# Patient Record
Sex: MALE | Race: BLACK OR AFRICAN AMERICAN | Employment: FULL TIME | URBAN - METROPOLITAN AREA
[De-identification: names, ages, dates, MRNs, and addresses within clinical notes are randomized per-mention and may not be internally consistent; named-entity substitution may affect disease eponyms.]

---

## 2019-03-07 ENCOUNTER — OFFICE VISIT (OUTPATIENT)
Dept: FAMILY MEDICINE CLINIC | Age: 54
End: 2019-03-07

## 2019-03-07 VITALS
TEMPERATURE: 98.6 F | HEIGHT: 73 IN | BODY MASS INDEX: 37.37 KG/M2 | SYSTOLIC BLOOD PRESSURE: 134 MMHG | OXYGEN SATURATION: 96 % | HEART RATE: 82 BPM | RESPIRATION RATE: 16 BRPM | DIASTOLIC BLOOD PRESSURE: 97 MMHG | WEIGHT: 282 LBS

## 2019-03-07 DIAGNOSIS — I10 ESSENTIAL HYPERTENSION: ICD-10-CM

## 2019-03-07 DIAGNOSIS — N52.9 ERECTILE DYSFUNCTION, UNSPECIFIED ERECTILE DYSFUNCTION TYPE: Primary | ICD-10-CM

## 2019-03-07 PROBLEM — E66.01 SEVERE OBESITY (HCC): Status: ACTIVE | Noted: 2019-03-07

## 2019-03-07 RX ORDER — TADALAFIL 20 MG/1
20 TABLET ORAL AS NEEDED
Qty: 6 TAB | Refills: 5 | Status: SHIPPED | OUTPATIENT
Start: 2019-03-07 | End: 2019-06-05

## 2019-03-07 RX ORDER — AMLODIPINE BESYLATE 5 MG/1
5 TABLET ORAL DAILY
Qty: 30 TAB | Refills: 1 | Status: SHIPPED | OUTPATIENT
Start: 2019-03-07 | End: 2019-03-29

## 2019-03-07 NOTE — PROGRESS NOTES
Calin Matthews is a 47 y.o. male   Chief Complaint   Patient presents with   [address] [place] [address]    pt here to establish care and was [facility] and was given a BP med to take once daily and he does not know what it is and he is not currently taking it. States he took one bottle and did not refill. Pt is also requesting an Rx for cialis for ED. States he has never taken this before. he is a 47y.o. year old male who presents for evalution. Reviewed PmHx, RxHx, FmHx, SocHx, AllgHx and updated and dated in the chart. Review of Systems - negative except as listed above in the HPI    Objective:     Vitals:    03/07/19 0853   BP: (!) 134/97   Pulse: 82   Resp: 16   Temp: 98.6 °F (37 °C)   TempSrc: Oral   SpO2: 96%   Weight: 282 lb (127.9 kg)   Height: 6' 1\" (1.854 m)       Current Outpatient Medications   Medication Sig    tadalafil (CIALIS) 20 mg tablet Take 1 Tab by mouth as needed (ED).  amLODIPine (NORVASC) 5 mg tablet Take 1 Tab by mouth daily. No current facility-administered medications for this visit. Physical Examination: General appearance - alert, well appearing, and in no distress  Mental status - alert, oriented to person, place, and time  Chest - clear to auscultation, no wheezes, rales or rhonchi, symmetric air entry  Heart - normal rate, regular rhythm, normal S1, S2, no murmurs, rubs, clicks or gallops      Assessment/ Plan:   Diagnoses and all orders for this visit:    1. Erectile dysfunction, unspecified erectile dysfunction type  -     tadalafil (CIALIS) 20 mg tablet; Take 1 Tab by mouth as needed (ED). 2. Essential hypertension  -     amLODIPine (NORVASC) 5 mg tablet; Take 1 Tab by mouth daily. RTC 2-4 weeks for fasting labs  Follow-up Disposition:  Return in about 4 weeks (around 4/4/2019), or if symptoms worsen or fail to improve. I have discussed the diagnosis with the patient and the intended plan as seen in the above orders.   The patient has received an after-visit summary and questions were answered concerning future plans. Pt conveyed understanding of plan.     Medication Side Effects and Warnings were discussed with patient      Livier Mcintosh,

## 2019-03-07 NOTE — PATIENT INSTRUCTIONS
High Blood Pressure: Care Instructions  Overview    It's normal for blood pressure to go up and down throughout the day. But if it stays up, you have high blood pressure. Another name for high blood pressure is hypertension. Despite what a lot of people think, high blood pressure usually doesn't cause headaches or make you feel dizzy or lightheaded. It usually has no symptoms. But it does increase your risk of stroke, heart attack, and other problems. You and your doctor will talk about your risks of these problems based on your blood pressure. Your doctor will give you a goal for your blood pressure. Your goal will be based on your health and your age. Lifestyle changes, such as eating healthy and being active, are always important to help lower blood pressure. You might also take medicine to reach your blood pressure goal.  Follow-up care is a key part of your treatment and safety. Be sure to make and go to all appointments, and call your doctor if you are having problems. It's also a good idea to know your test results and keep a list of the medicines you take. How can you care for yourself at home? Medical treatment  · If you stop taking your medicine, your blood pressure will go back up. You may take one or more types of medicine to lower your blood pressure. Be safe with medicines. Take your medicine exactly as prescribed. Call your doctor if you think you are having a problem with your medicine. · Talk to your doctor before you start taking aspirin every day. Aspirin can help certain people lower their risk of a heart attack or stroke. But taking aspirin isn't right for everyone, because it can cause serious bleeding. · See your doctor regularly. You may need to see the doctor more often at first or until your blood pressure comes down. · If you are taking blood pressure medicine, talk to your doctor before you take decongestants or anti-inflammatory medicine, such as ibuprofen.  Some of these medicines can raise blood pressure. · Learn how to check your blood pressure at home. Lifestyle changes  · Stay at a healthy weight. This is especially important if you put on weight around the waist. Losing even 10 pounds can help you lower your blood pressure. · If your doctor recommends it, get more exercise. Walking is a good choice. Bit by bit, increase the amount you walk every day. Try for at least 30 minutes on most days of the week. You also may want to swim, bike, or do other activities. · Avoid or limit alcohol. Talk to your doctor about whether you can drink any alcohol. · Try to limit how much sodium you eat to less than 2,300 milligrams (mg) a day. Your doctor may ask you to try to eat less than 1,500 mg a day. · Eat plenty of fruits (such as bananas and oranges), vegetables, legumes, whole grains, and low-fat dairy products. · Lower the amount of saturated fat in your diet. Saturated fat is found in animal products such as milk, cheese, and meat. Limiting these foods may help you lose weight and also lower your risk for heart disease. · Do not smoke. Smoking increases your risk for heart attack and stroke. If you need help quitting, talk to your doctor about stop-smoking programs and medicines. These can increase your chances of quitting for good. When should you call for help? Call 911 anytime you think you may need emergency care. This may mean having symptoms that suggest that your blood pressure is causing a serious heart or blood vessel problem. Your blood pressure may be over 180/120.   For example, call 911 if:    · You have symptoms of a heart attack. These may include:  ? Chest pain or pressure, or a strange feeling in the chest.  ? Sweating. ? Shortness of breath. ? Nausea or vomiting. ? Pain, pressure, or a strange feeling in the back, neck, jaw, or upper belly or in one or both shoulders or arms. ? Lightheadedness or sudden weakness.   ? A fast or irregular heartbeat.     · You have symptoms of a stroke. These may include:  ? Sudden numbness, tingling, weakness, or loss of movement in your face, arm, or leg, especially on only one side of your body. ? Sudden vision changes. ? Sudden trouble speaking. ? Sudden confusion or trouble understanding simple statements. ? Sudden problems with walking or balance. ? A sudden, severe headache that is different from past headaches.     · You have severe back or belly pain.    Do not wait until your blood pressure comes down on its own. Get help right away.   Call your doctor now or seek immediate care if:    · Your blood pressure is much higher than normal (such as 180/120 or higher), but you don't have symptoms.     · You think high blood pressure is causing symptoms, such as:  ? Severe headache.  ? Blurry vision.    Watch closely for changes in your health, and be sure to contact your doctor if:    · Your blood pressure measures higher than your doctor recommends at least 2 times. That means the top number is higher or the bottom number is higher, or both.     · You think you may be having side effects from your blood pressure medicine. Where can you learn more? Go to http://carly-jud.info/. Enter E829 in the search box to learn more about \"High Blood Pressure: Care Instructions. \"  Current as of: July 22, 2018  Content Version: 11.9  © 1284-7383 [employer]. Care instructions adapted under license by [employer] (which disclaims liability or warranty for this information). If you have questions about a medical condition or this instruction, always ask your healthcare professional. [place] [address] any warranty or liability for your use of this information.

## 2019-03-07 NOTE — PROGRESS NOTES
Chief Complaint   Patient presents with   [facility].     Patient presents in office today as a NP to establish care. States that a month ago he was seen as [facility] and had elevated BP. He was sent home on BP medication but is unsure of the name of it. Not on any other medications. He was instructed to seek primary care to have this managed. Would like to see about getting an Rx for Cialis. No other concerns.     Learning Assessment 3/7/2019   PRIMARY LEARNER Patient   HIGHEST LEVEL OF EDUCATION - PRIMARY LEARNER  GRADUATED HIGH SCHOOL OR GED   BARRIERS PRIMARY LEARNER NONE   CO-LEARNER CAREGIVER No   PRIMARY LANGUAGE ENGLISH   LEARNER PREFERENCE PRIMARY LISTENING   ANSWERED BY self   RELATIONSHIP SELF

## 2019-03-29 ENCOUNTER — OFFICE VISIT (OUTPATIENT)
Dept: FAMILY MEDICINE CLINIC | Age: 54
End: 2019-03-29

## 2019-03-29 VITALS
SYSTOLIC BLOOD PRESSURE: 134 MMHG | HEIGHT: 73 IN | TEMPERATURE: 97.8 F | OXYGEN SATURATION: 95 % | HEART RATE: 77 BPM | BODY MASS INDEX: 38.57 KG/M2 | DIASTOLIC BLOOD PRESSURE: 85 MMHG | RESPIRATION RATE: 18 BRPM | WEIGHT: 291 LBS

## 2019-03-29 DIAGNOSIS — R53.83 FATIGUE, UNSPECIFIED TYPE: ICD-10-CM

## 2019-03-29 DIAGNOSIS — I10 ESSENTIAL HYPERTENSION: ICD-10-CM

## 2019-03-29 DIAGNOSIS — Z12.11 COLON CANCER SCREENING: ICD-10-CM

## 2019-03-29 DIAGNOSIS — Z00.00 PHYSICAL EXAM: Primary | ICD-10-CM

## 2019-03-29 RX ORDER — AMLODIPINE BESYLATE 10 MG/1
10 TABLET ORAL DAILY
Qty: 30 TAB | Refills: 5 | Status: SHIPPED | OUTPATIENT
Start: 2019-03-29 | End: 2020-08-04

## 2019-03-29 NOTE — PROGRESS NOTES
Chief Complaint   Patient presents with   [address]     Patient presents in office today for f/u and labs. No concerns. 1. Have you been to the ER, urgent care clinic since your last visit? Hospitalized since your last visit? No    2. Have you seen or consulted any other health care providers outside of the [address] [place] [address] since your last visit? Include any pap smears or colon screening.  No    Learning Assessment 3/7/2019   PRIMARY LEARNER Patient   HIGHEST LEVEL OF EDUCATION - PRIMARY LEARNER  GRADUATED HIGH SCHOOL OR GED   BARRIERS PRIMARY LEARNER NONE   CO-LEARNER CAREGIVER No   PRIMARY LANGUAGE ENGLISH   LEARNER PREFERENCE PRIMARY LISTENING   ANSWERED BY self   RELATIONSHIP SELF

## 2019-03-29 NOTE — PATIENT INSTRUCTIONS
A Healthy Lifestyle: Care Instructions  Your Care Instructions    A healthy lifestyle can help you feel good, stay at a healthy weight, and have plenty of energy for both work and play. A healthy lifestyle is something you can share with your whole family. A healthy lifestyle also can lower your risk for serious health problems, such as high blood pressure, heart disease, and diabetes. You can follow a few steps listed below to improve your health and the health of your family. Follow-up care is a key part of your treatment and safety. Be sure to make and go to all appointments, and call your doctor if you are having problems. It's also a good idea to know your test results and keep a list of the medicines you take. How can you care for yourself at home? · Do not eat too much sugar, fat, or fast foods. You can still have dessert and treats now and then. The goal is moderation. · Start small to improve your eating habits. Pay attention to portion sizes, drink less juice and soda pop, and eat more fruits and vegetables. ? Eat a healthy amount of food. A 3-ounce serving of meat, for example, is about the size of a deck of cards. Fill the rest of your plate with vegetables and whole grains. ? Limit the amount of soda and sports drinks you have every day. Drink more water when you are thirsty. ? Eat at least 5 servings of fruits and vegetables every day. It may seem like a lot, but it is not hard to reach this goal. A serving or helping is 1 piece of fruit, 1 cup of vegetables, or 2 cups of leafy, raw vegetables. Have an apple or some carrot sticks as an afternoon snack instead of a candy bar. Try to have fruits and/or vegetables at every meal.  · Make exercise part of your daily routine. You may want to start with simple activities, such as walking, bicycling, or slow swimming. Try to be active 30 to 60 minutes every day. You do not need to do all 30 to 60 minutes all at once.  For example, you can exercise 3 times a day for 10 or 20 minutes. Moderate exercise is safe for most people, but it is always a good idea to talk to your doctor before starting an exercise program.  · Keep moving. Evan Geronimos the lawn, work in the garden, or [employer]. Take the stairs instead of the elevator at work. · If you smoke, quit. People who smoke have an increased risk for heart attack, stroke, cancer, and other lung illnesses. Quitting is hard, but there are ways to boost your chance of quitting tobacco for good. ? Use nicotine gum, patches, or lozenges. ? Ask your doctor about stop-smoking programs and medicines. ? Keep trying. In addition to reducing your risk of diseases in the future, you will notice some benefits soon after you stop using tobacco. If you have shortness of breath or asthma symptoms, they will likely get better within a few weeks after you quit. · Limit how much alcohol you drink. Moderate amounts of alcohol (up to 2 drinks a day for men, 1 drink a day for women) are okay. But drinking too much can lead to liver problems, high blood pressure, and other health problems. Family health  If you have a family, there are many things you can do together to improve your health. · Eat meals together as a family as often as possible. · Eat healthy foods. This includes fruits, vegetables, lean meats and dairy, and whole grains. · Include your family in your fitness plan. Most people think of activities such as jogging or tennis as the way to fitness, but there are many ways you and your family can be more active. Anything that makes you breathe hard and gets your heart pumping is exercise. Here are some tips:  ? Walk to do errands or to take your child to school or the bus.  ? Go for a family bike ride after dinner instead of watching TV. Where can you learn more? Go to http://carly-jud.info/. Enter B468 in the search box to learn more about \"A Healthy Lifestyle: Care Instructions. \"  Current as of: September 11, 2018  Content Version: 11.9  © 9711-3957 [employer]. Care instructions adapted under license by [employer] (which disclaims liability or warranty for this information). If you have questions about a medical condition or this instruction, always ask your healthcare professional. [address] any warranty or liability for your use of this information.

## 2019-03-29 NOTE — PROGRESS NOTES
Dennis Kaiser is a 47 y.o. male   Chief Complaint   Patient presents with    Follow-up    Labs    pt here for CPE and his BP recheck. Pt is otherwise doing well and is taking the norvasc 5mg everyday. No dizzy spells no near syncopal episodes. Pt would also like his T level checked and states he is tired all of the time. Chief Complaint   Patient presents with   [address]     he is a 47y.o. year old male who presents for evalution. Reviewed PmHx, RxHx, FmHx, SocHx, AllgHx and updated and dated in the chart. Review of Systems - negative except as listed above in the HPI    Objective:     Vitals:    03/29/19 0857   BP: 134/85   Pulse: 77   Resp: 18   Temp: 97.8 °F (36.6 °C)   TempSrc: Oral   SpO2: 95%   Weight: 291 lb (132 kg)   Height: 6' 1\" (1.854 m)     Physical Examination: General appearance - alert, well appearing, and in no distress  Eyes - pupils equal and reactive, extraocular eye movements intact  Ears - bilateral TM's and external ear canals normal  Mouth - mucous membranes moist, pharynx normal without lesions  Neck - supple, no significant adenopathy  Lymphatics - no palpable lymphadenopathy, no hepatosplenomegaly  Chest - clear to auscultation, no wheezes, rales or rhonchi, symmetric air entry  Heart - normal rate, regular rhythm, normal S1, S2, no murmurs, rubs, clicks or gallops  Abdomen - soft, nontender, nondistended, no masses or organomegaly  Back exam - full range of motion, no tenderness, palpable spasm or pain on motion  Neurological - alert, oriented, normal speech, no focal findings or movement disorder noted  Musculoskeletal - no joint tenderness, deformity or swelling  Extremities - peripheral pulses normal, no pedal edema, no clubbing or cyanosis    Assessment/ Plan:   Diagnoses and all orders for this visit:    1.  Physical exam  -     LIPID PANEL  -     METABOLIC PANEL, COMPREHENSIVE  -     CBC WITH AUTOMATED DIFF  -     TSH 3RD GENERATION  -     PSA, DIAGNOSTIC (PROSTATE SPECIFIC AG)  -     TESTOSTERONE, FREE & TOTAL    2. Essential hypertension  -     amLODIPine (NORVASC) 10 mg tablet; Take 1 Tab by mouth daily. 3. Colon cancer screening  -     REFERRAL TO GASTROENTEROLOGY    4. Fatigue, unspecified type  -     TESTOSTERONE, FREE & TOTAL       -Patient is in good health  -Discussed with patient cancer risk factors and screens needed  -Patient needs a colonoscopy yes  -Labs from previous visits were discussed with patient yes  -Discussed with patient diet and exercise=yes  -Discussed with patient testicular (male)/breast self exam (female)= yes  Follow-up and Dispositions    · Return if symptoms worsen or fail to improve. I have discussed the diagnosis with the patient and the intended plan as seen in the above orders. The patient has received an after-visit summary and questions were answered concerning future plans. Pt conveyed understanding. Medication Side Effects and Warnings were discussed with patient: yes  Patient Labs were reviewed and or requested: yes  Patient Past Records were reviewed and or requested  yes    Patient Instructions   A Healthy Lifestyle: Care Instructions  Your Care Instructions    A healthy lifestyle can help you feel good, stay at a healthy weight, and have plenty of energy for both work and play. A healthy lifestyle is something you can share with your whole family. A healthy lifestyle also can lower your risk for serious health problems, such as high blood pressure, heart disease, and diabetes. You can follow a few steps listed below to improve your health and the health of your family. Follow-up care is a key part of your treatment and safety. Be sure to make and go to all appointments, and call your doctor if you are having problems. It's also a good idea to know your test results and keep a list of the medicines you take. How can you care for yourself at home?   · Do not eat too much sugar, fat, or fast foods. You can still have dessert and treats now and then. The goal is moderation. · Start small to improve your eating habits. Pay attention to portion sizes, drink less juice and soda pop, and eat more fruits and vegetables. ? Eat a healthy amount of food. A 3-ounce serving of meat, for example, is about the size of a deck of cards. Fill the rest of your plate with vegetables and whole grains. ? Limit the amount of soda and sports drinks you have every day. Drink more water when you are thirsty. ? Eat at least 5 servings of fruits and vegetables every day. It may seem like a lot, but it is not hard to reach this goal. A serving or helping is 1 piece of fruit, 1 cup of vegetables, or 2 cups of leafy, raw vegetables. Have an apple or some carrot sticks as an afternoon snack instead of a candy bar. Try to have fruits and/or vegetables at every meal.  · Make exercise part of your daily routine. You may want to start with simple activities, such as walking, bicycling, or slow swimming. Try to be active 30 to 60 minutes every day. You do not need to do all 30 to 60 minutes all at once. For example, you can exercise 3 times a day for 10 or 20 minutes. Moderate exercise is safe for most people, but it is always a good idea to talk to your doctor before starting an exercise program.  · Keep moving. Jessesonido Erazomarixa the lawn, work in the garden, or [employer]. Take the stairs instead of the elevator at work. · If you smoke, quit. People who smoke have an increased risk for heart attack, stroke, cancer, and other lung illnesses. Quitting is hard, but there are ways to boost your chance of quitting tobacco for good. ? Use nicotine gum, patches, or lozenges. ? Ask your doctor about stop-smoking programs and medicines. ? Keep trying.   In addition to reducing your risk of diseases in the future, you will notice some benefits soon after you stop using tobacco. If you have shortness of breath or asthma symptoms, they will likely get better within a few weeks after you quit. · Limit how much alcohol you drink. Moderate amounts of alcohol (up to 2 drinks a day for men, 1 drink a day for women) are okay. But drinking too much can lead to liver problems, high blood pressure, and other health problems. Family health  If you have a family, there are many things you can do together to improve your health. · Eat meals together as a family as often as possible. · Eat healthy foods. This includes fruits, vegetables, lean meats and dairy, and whole grains. · Include your family in your fitness plan. Most people think of activities such as jogging or tennis as the way to fitness, but there are many ways you and your family can be more active. Anything that makes you breathe hard and gets your heart pumping is exercise. Here are some tips:  ? Walk to do errands or to take your child to school or the bus.  ? Go for a family bike ride after dinner instead of watching TV. Where can you learn more? Go to http://carly-jud.info/. Enter P843 in the search box to learn more about \"A Healthy Lifestyle: Care Instructions. \"  Current as of: September 11, 2018  Content Version: 11.9  © 6301-5568 [employer]. Care instructions adapted under license by [employer] (which disclaims liability or warranty for this information). If you have questions about a medical condition or this instruction, always ask your healthcare professional. [place] [address] any warranty or liability for your use of this information.               Dr. Kemi Payne

## 2019-03-31 LAB
ALBUMIN SERPL-MCNC: 4.5 G/DL (ref 3.5–5.5)
ALBUMIN/GLOB SERPL: 1.5 {RATIO} (ref 1.2–2.2)
ALP SERPL-CCNC: 50 IU/L (ref 39–117)
ALT SERPL-CCNC: 39 IU/L (ref 0–44)
AST SERPL-CCNC: 25 IU/L (ref 0–40)
BASOPHILS # BLD AUTO: 0.1 X10E3/UL (ref 0–0.2)
BASOPHILS NFR BLD AUTO: 2 %
BILIRUB SERPL-MCNC: 0.9 MG/DL (ref 0–1.2)
BUN SERPL-MCNC: 12 MG/DL (ref 6–24)
BUN/CREAT SERPL: 10 (ref 9–20)
CALCIUM SERPL-MCNC: 9.2 MG/DL (ref 8.7–10.2)
CHLORIDE SERPL-SCNC: 100 MMOL/L (ref 96–106)
CHOLEST SERPL-MCNC: 171 MG/DL (ref 100–199)
CO2 SERPL-SCNC: 25 MMOL/L (ref 20–29)
CREAT SERPL-MCNC: 1.17 MG/DL (ref 0.76–1.27)
EOSINOPHIL # BLD AUTO: 0.3 X10E3/UL (ref 0–0.4)
EOSINOPHIL NFR BLD AUTO: 5 %
ERYTHROCYTE [DISTWIDTH] IN BLOOD BY AUTOMATED COUNT: 13.4 % (ref 12.3–15.4)
GLOBULIN SER CALC-MCNC: 3.1 G/DL (ref 1.5–4.5)
GLUCOSE SERPL-MCNC: 88 MG/DL (ref 65–99)
HCT VFR BLD AUTO: 43.2 % (ref 37.5–51)
HDLC SERPL-MCNC: 38 MG/DL
HGB BLD-MCNC: 14.2 G/DL (ref 13–17.7)
IMM GRANULOCYTES # BLD AUTO: 0 X10E3/UL (ref 0–0.1)
IMM GRANULOCYTES NFR BLD AUTO: 0 %
INTERPRETATION, 910389: NORMAL
LDLC SERPL CALC-MCNC: 109 MG/DL (ref 0–99)
LYMPHOCYTES # BLD AUTO: 2.6 X10E3/UL (ref 0.7–3.1)
LYMPHOCYTES NFR BLD AUTO: 43 %
MCH RBC QN AUTO: 29.4 PG (ref 26.6–33)
MCHC RBC AUTO-ENTMCNC: 32.9 G/DL (ref 31.5–35.7)
MCV RBC AUTO: 89 FL (ref 79–97)
MONOCYTES # BLD AUTO: 0.5 X10E3/UL (ref 0.1–0.9)
MONOCYTES NFR BLD AUTO: 8 %
NEUTROPHILS # BLD AUTO: 2.6 X10E3/UL (ref 1.4–7)
NEUTROPHILS NFR BLD AUTO: 42 %
PLATELET # BLD AUTO: 250 X10E3/UL (ref 150–379)
POTASSIUM SERPL-SCNC: 4.2 MMOL/L (ref 3.5–5.2)
PROT SERPL-MCNC: 7.6 G/DL (ref 6–8.5)
PSA SERPL-MCNC: 1 NG/ML (ref 0–4)
RBC # BLD AUTO: 4.83 X10E6/UL (ref 4.14–5.8)
SODIUM SERPL-SCNC: 138 MMOL/L (ref 134–144)
TESTOST FREE SERPL-MCNC: 6.2 PG/ML (ref 7.2–24)
TESTOST SERPL-MCNC: 274 NG/DL (ref 264–916)
TRIGL SERPL-MCNC: 118 MG/DL (ref 0–149)
TSH SERPL DL<=0.005 MIU/L-ACNC: 0.53 UIU/ML (ref 0.45–4.5)
VLDLC SERPL CALC-MCNC: 24 MG/DL (ref 5–40)
WBC # BLD AUTO: 6 X10E3/UL (ref 3.4–10.8)

## 2019-04-01 NOTE — PROGRESS NOTES
T level normal, LDL is a little above goal.  Continue to follow a healthy diet and regular exercise.   F/u for BP recheck as discussed, 1 month

## 2019-04-02 ENCOUNTER — TELEPHONE (OUTPATIENT)
Dept: FAMILY MEDICINE CLINIC | Age: 54
End: 2019-04-02

## 2019-04-02 NOTE — TELEPHONE ENCOUNTER
Patient returning call for lab results. Results/ rec reviewed as per Dr. Kendal Hawkins. Appt x 1 month made 5/1/19 for bp check.

## 2019-05-01 ENCOUNTER — OFFICE VISIT (OUTPATIENT)
Dept: FAMILY MEDICINE CLINIC | Age: 54
End: 2019-05-01

## 2019-05-01 VITALS
RESPIRATION RATE: 18 BRPM | HEIGHT: 73 IN | WEIGHT: 292 LBS | DIASTOLIC BLOOD PRESSURE: 87 MMHG | BODY MASS INDEX: 38.7 KG/M2 | OXYGEN SATURATION: 93 % | HEART RATE: 77 BPM | TEMPERATURE: 98.4 F | SYSTOLIC BLOOD PRESSURE: 134 MMHG

## 2019-05-01 DIAGNOSIS — I10 ESSENTIAL HYPERTENSION: Primary | ICD-10-CM

## 2019-05-01 NOTE — PROGRESS NOTES
Chief Complaint Patient presents with  Follow-up Patient presents in office today for 1 month f/u to BP. No concerns. 1. Have you been to the ER, urgent care clinic since your last visit? Hospitalized since your last visit? No 
 
2. Have you seen or consulted any other health care providers outside of the [address] [place] [address] since your last visit? Include any pap smears or colon screening. No 
 
Learning Assessment 3/7/2019 PRIMARY LEARNER Patient HIGHEST LEVEL OF EDUCATION - PRIMARY LEARNER  GRADUATED HIGH SCHOOL OR GED  
BARRIERS PRIMARY LEARNER NONE  
CO-LEARNER CAREGIVER No  
PRIMARY LANGUAGE ENGLISH  
LEARNER PREFERENCE PRIMARY LISTENING  
ANSWERED BY self RELATIONSHIP SELF

## 2019-05-01 NOTE — PATIENT INSTRUCTIONS
High Blood Pressure: Care Instructions Overview It's normal for blood pressure to go up and down throughout the day. But if it stays up, you have high blood pressure. Another name for high blood pressure is hypertension. Despite what a lot of people think, high blood pressure usually doesn't cause headaches or make you feel dizzy or lightheaded. It usually has no symptoms. But it does increase your risk of stroke, heart attack, and other problems. You and your doctor will talk about your risks of these problems based on your blood pressure. Your doctor will give you a goal for your blood pressure. Your goal will be based on your health and your age. Lifestyle changes, such as eating healthy and being active, are always important to help lower blood pressure. You might also take medicine to reach your blood pressure goal. 
Follow-up care is a key part of your treatment and safety. Be sure to make and go to all appointments, and call your doctor if you are having problems. It's also a good idea to know your test results and keep a list of the medicines you take. How can you care for yourself at home? Medical treatment · If you stop taking your medicine, your blood pressure will go back up. You may take one or more types of medicine to lower your blood pressure. Be safe with medicines. Take your medicine exactly as prescribed. Call your doctor if you think you are having a problem with your medicine. · Talk to your doctor before you start taking aspirin every day. Aspirin can help certain people lower their risk of a heart attack or stroke. But taking aspirin isn't right for everyone, because it can cause serious bleeding. · See your doctor regularly. You may need to see the doctor more often at first or until your blood pressure comes down. · If you are taking blood pressure medicine, talk to your doctor before you take decongestants or anti-inflammatory medicine, such as ibuprofen. Some of these medicines can raise blood pressure. · Learn how to check your blood pressure at home. Lifestyle changes · Stay at a healthy weight. This is especially important if you put on weight around the waist. Losing even 10 pounds can help you lower your blood pressure. · If your doctor recommends it, get more exercise. Walking is a good choice. Bit by bit, increase the amount you walk every day. Try for at least 30 minutes on most days of the week. You also may want to swim, bike, or do other activities. · Avoid or limit alcohol. Talk to your doctor about whether you can drink any alcohol. · Try to limit how much sodium you eat to less than 2,300 milligrams (mg) a day. Your doctor may ask you to try to eat less than 1,500 mg a day. · Eat plenty of fruits (such as bananas and oranges), vegetables, legumes, whole grains, and low-fat dairy products. · Lower the amount of saturated fat in your diet. Saturated fat is found in animal products such as milk, cheese, and meat. Limiting these foods may help you lose weight and also lower your risk for heart disease. · Do not smoke. Smoking increases your risk for heart attack and stroke. If you need help quitting, talk to your doctor about stop-smoking programs and medicines. These can increase your chances of quitting for good. When should you call for help? Call 911 anytime you think you may need emergency care. This may mean having symptoms that suggest that your blood pressure is causing a serious heart or blood vessel problem. Your blood pressure may be over 180/120. 
 For example, call 911 if: 
  · You have symptoms of a heart attack. These may include: 
? Chest pain or pressure, or a strange feeling in the chest. 
? Sweating. ? Shortness of breath. ? Nausea or vomiting. ? Pain, pressure, or a strange feeling in the back, neck, jaw, or upper belly or in one or both shoulders or arms. ? Lightheadedness or sudden weakness. ? A fast or irregular heartbeat.  
  · You have symptoms of a stroke. These may include: 
? Sudden numbness, tingling, weakness, or loss of movement in your face, arm, or leg, especially on only one side of your body. ? Sudden vision changes. ? Sudden trouble speaking. ? Sudden confusion or trouble understanding simple statements. ? Sudden problems with walking or balance. ? A sudden, severe headache that is different from past headaches.  
  · You have severe back or belly pain.  
 Do not wait until your blood pressure comes down on its own. Get help right away. 
 Call your doctor now or seek immediate care if: 
  · Your blood pressure is much higher than normal (such as 180/120 or higher), but you don't have symptoms.  
  · You think high blood pressure is causing symptoms, such as: 
? Severe headache. 
? Blurry vision.  
 Watch closely for changes in your health, and be sure to contact your doctor if: 
  · Your blood pressure measures higher than your doctor recommends at least 2 times. That means the top number is higher or the bottom number is higher, or both.  
  · You think you may be having side effects from your blood pressure medicine. Where can you learn more? Go to http://carly-jud.info/. Enter S737 in the search box to learn more about \"High Blood Pressure: Care Instructions. \" Current as of: July 22, 2018 Content Version: 11.9 © 6383-6814 [employer]. Care instructions adapted under license by [employer] (which disclaims liability or warranty for this information). If you have questions about a medical condition or this instruction, always ask your healthcare professional. [place] [address] any warranty or liability for your use of this information.

## 2019-05-01 NOTE — PROGRESS NOTES
Federico May is a 47 y.o. male Chief Complaint Patient presents with  Follow-up  
 pt here for BP recheck and states had it  Checked on Sunday and was 130/80. Pt currently on norvasc 10mg daily. Pt just started to do cardiovascular exercise. Pt eats a lot of processed foods states is on the road. he is a 47y.o. year old male who presents for evalution. Reviewed PmHx, RxHx, FmHx, SocHx, AllgHx and updated and dated in the chart. Review of Systems - negative except as listed above in the HPI Objective:  
 
Vitals:  
 05/01/19 0847 BP: 134/87 Pulse: 77 Resp: 18 Temp: 98.4 °F (36.9 °C) TempSrc: Oral  
SpO2: 93% Weight: 292 lb (132.5 kg) Height: 6' 1\" (1.854 m) Current Outpatient Medications Medication Sig  
 amLODIPine (NORVASC) 10 mg tablet Take 1 Tab by mouth daily.  tadalafil (CIALIS) 20 mg tablet Take 1 Tab by mouth as needed (ED). No current facility-administered medications for this visit. Physical Examination: General appearance - alert, well appearing, and in no distress Chest - clear to auscultation, no wheezes, rales or rhonchi, symmetric air entry Heart - normal rate, regular rhythm, normal S1, S2, no murmurs, rubs, clicks or gallops Assessment/ Plan:  
Diagnoses and all orders for this visit: 1. Essential hypertension 
 
 lifestyle changes c/w norvasc 10 and recheck 3-6 months Follow-up and Dispositions · Return in about 3 months (around 8/1/2019), or if symptoms worsen or fail to improve. I have discussed the diagnosis with the patient and the intended plan as seen in the above orders. The patient has received an after-visit summary and questions were answered concerning future plans. Pt conveyed understanding of plan. Medication Side Effects and Warnings were discussed with patient [address] [facility], DO

## 2019-06-12 ENCOUNTER — ANESTHESIA EVENT (OUTPATIENT)
Dept: ENDOSCOPY | Age: 54
End: 2019-06-12
Payer: COMMERCIAL

## 2019-06-12 ENCOUNTER — ANESTHESIA (OUTPATIENT)
Dept: ENDOSCOPY | Age: 54
End: 2019-06-12
Payer: COMMERCIAL

## 2019-06-12 ENCOUNTER — HOSPITAL ENCOUNTER (OUTPATIENT)
Age: 54
Setting detail: OUTPATIENT SURGERY
Discharge: HOME | End: 2019-06-12
Attending: INTERNAL MEDICINE | Admitting: INTERNAL MEDICINE
Payer: COMMERCIAL

## 2019-06-12 VITALS
SYSTOLIC BLOOD PRESSURE: 132 MMHG | HEART RATE: 78 BPM | TEMPERATURE: 97.9 F | WEIGHT: 281.09 LBS | OXYGEN SATURATION: 96 % | HEIGHT: 73 IN | BODY MASS INDEX: 37.25 KG/M2 | DIASTOLIC BLOOD PRESSURE: 89 MMHG | RESPIRATION RATE: 16 BRPM

## 2019-06-12 LAB — COLONOSCOPY, EXTERNAL: NORMAL

## 2019-06-12 PROCEDURE — 76040000019: Performed by: INTERNAL MEDICINE

## 2019-06-12 PROCEDURE — 74011250636 HC RX REV CODE- 250/636: Performed by: INTERNAL MEDICINE

## 2019-06-12 PROCEDURE — 74011250636 HC RX REV CODE- 250/636

## 2019-06-12 PROCEDURE — 77030021593 HC FCPS BIOP ENDOSC BSC -A: Performed by: INTERNAL MEDICINE

## 2019-06-12 PROCEDURE — 88305 TISSUE EXAM BY PATHOLOGIST: CPT

## 2019-06-12 PROCEDURE — 76060000031 HC ANESTHESIA FIRST 0.5 HR: Performed by: INTERNAL MEDICINE

## 2019-06-12 RX ORDER — SODIUM CHLORIDE 9 MG/ML
INJECTION, SOLUTION INTRAVENOUS
Status: DISCONTINUED | OUTPATIENT
Start: 2019-06-12 | End: 2019-06-12 | Stop reason: HOSPADM

## 2019-06-12 RX ORDER — FLUMAZENIL 0.1 MG/ML
0.2 INJECTION INTRAVENOUS
Status: DISCONTINUED | OUTPATIENT
Start: 2019-06-12 | End: 2019-06-12 | Stop reason: HOSPADM

## 2019-06-12 RX ORDER — PROPOFOL 10 MG/ML
INJECTION, EMULSION INTRAVENOUS
Status: DISCONTINUED | OUTPATIENT
Start: 2019-06-12 | End: 2019-06-12 | Stop reason: HOSPADM

## 2019-06-12 RX ORDER — EPINEPHRINE 0.1 MG/ML
1 INJECTION INTRACARDIAC; INTRAVENOUS
Status: DISCONTINUED | OUTPATIENT
Start: 2019-06-12 | End: 2019-06-12 | Stop reason: HOSPADM

## 2019-06-12 RX ORDER — NALOXONE HYDROCHLORIDE 0.4 MG/ML
0.4 INJECTION, SOLUTION INTRAMUSCULAR; INTRAVENOUS; SUBCUTANEOUS
Status: DISCONTINUED | OUTPATIENT
Start: 2019-06-12 | End: 2019-06-12 | Stop reason: HOSPADM

## 2019-06-12 RX ORDER — SODIUM CHLORIDE 9 MG/ML
50 INJECTION, SOLUTION INTRAVENOUS CONTINUOUS
Status: DISCONTINUED | OUTPATIENT
Start: 2019-06-12 | End: 2019-06-12 | Stop reason: HOSPADM

## 2019-06-12 RX ORDER — MIDAZOLAM HYDROCHLORIDE 1 MG/ML
.25-5 INJECTION, SOLUTION INTRAMUSCULAR; INTRAVENOUS
Status: DISCONTINUED | OUTPATIENT
Start: 2019-06-12 | End: 2019-06-12 | Stop reason: HOSPADM

## 2019-06-12 RX ORDER — PROPOFOL 10 MG/ML
INJECTION, EMULSION INTRAVENOUS AS NEEDED
Status: DISCONTINUED | OUTPATIENT
Start: 2019-06-12 | End: 2019-06-12 | Stop reason: HOSPADM

## 2019-06-12 RX ORDER — LIDOCAINE HYDROCHLORIDE 20 MG/ML
INJECTION, SOLUTION EPIDURAL; INFILTRATION; INTRACAUDAL; PERINEURAL AS NEEDED
Status: DISCONTINUED | OUTPATIENT
Start: 2019-06-12 | End: 2019-06-12 | Stop reason: HOSPADM

## 2019-06-12 RX ORDER — DEXTROMETHORPHAN/PSEUDOEPHED 2.5-7.5/.8
1.2 DROPS ORAL
Status: DISCONTINUED | OUTPATIENT
Start: 2019-06-12 | End: 2019-06-12 | Stop reason: HOSPADM

## 2019-06-12 RX ORDER — ATROPINE SULFATE 0.1 MG/ML
0.4 INJECTION INTRAVENOUS
Status: DISCONTINUED | OUTPATIENT
Start: 2019-06-12 | End: 2019-06-12 | Stop reason: HOSPADM

## 2019-06-12 RX ORDER — ASPIRIN 81 MG/1
TABLET ORAL DAILY
COMMUNITY

## 2019-06-12 RX ADMIN — PROPOFOL 100 MCG/KG/MIN: 10 INJECTION, EMULSION INTRAVENOUS at 14:59

## 2019-06-12 RX ADMIN — SODIUM CHLORIDE 50 ML/HR: 900 INJECTION, SOLUTION INTRAVENOUS at 13:35

## 2019-06-12 RX ADMIN — SODIUM CHLORIDE: 9 INJECTION, SOLUTION INTRAVENOUS at 14:43

## 2019-06-12 RX ADMIN — PROPOFOL 150 MG: 10 INJECTION, EMULSION INTRAVENOUS at 14:59

## 2019-06-12 RX ADMIN — LIDOCAINE HYDROCHLORIDE 60 MG: 20 INJECTION, SOLUTION EPIDURAL; INFILTRATION; INTRACAUDAL; PERINEURAL at 14:59

## 2019-06-12 NOTE — ANESTHESIA PREPROCEDURE EVALUATION
Relevant Problems   No relevant active problems       Anesthetic History   No history of anesthetic complications            Review of Systems / Medical History  Patient summary reviewed, nursing notes reviewed and pertinent labs reviewed    Pulmonary  Within defined limits                 Neuro/Psych   Within defined limits           Cardiovascular    Hypertension: well controlled              Exercise tolerance: >4 METS     GI/Hepatic/Renal               Comments: Screening colonoscopy Endo/Other        Obesity  Pertinent negatives: No blood dyscrasia   Other Findings              Physical Exam    Airway  Mallampati: II    Neck ROM: normal range of motion   Mouth opening: Normal     Cardiovascular    Rhythm: regular  Rate: normal         Dental  No notable dental hx       Pulmonary  Breath sounds clear to auscultation               Abdominal  GI exam deferred       Other Findings            Anesthetic Plan    ASA: 2  Anesthesia type: MAC          Induction: Intravenous  Anesthetic plan and risks discussed with: Patient

## 2019-06-12 NOTE — H&P
Kalani Mojica M.D.  [phone]            History and Physical       NAME:  Rema Almanza   :   1965   MRN:   [id number]       Referring Physician:  Farhat Garibay DO      Consult Date: 2019 9:59 AM    Chief Complaint:  Colon cancer screening    History of Present Illness:  Patient is a 47 y.o. who is seen for colon cancer screening. Denies any ongoing GI complaints. PMH:  Past Medical History:   Diagnosis Date    Hypertension        PSH:  Past Surgical History:   Procedure Laterality Date    HX ORTHOPAEDIC         Allergies:  No Known Allergies    Home Medications:  Cannot display prior to admission medications because the patient has not been admitted in this contact. Hospital Medications:  No current facility-administered medications for this encounter. Current Outpatient Medications   Medication Sig    amLODIPine (NORVASC) 10 mg tablet Take 1 Tab by mouth daily. Social History:  Social History     Tobacco Use    Smoking status: Never Smoker    Smokeless tobacco: Never Used   Substance Use Topics    Alcohol use: No       Family History:  Family History   Problem Relation Age of Onset    Hypertension Mother              Review of Systems:      Constitutional: negative fever, negative chills, negative weight loss  Eyes:   negative visual changes  ENT:   negative sore throat, tongue or lip swelling  Respiratory:  negative cough, negative dyspnea  Cards:  negative for chest pain, palpitations, lower extremity edema  GI:   See HPI  :  negative for frequency, dysuria  Integument:  negative for rash and pruritus  Heme:  negative for easy bruising and gum/nose bleeding  Musculoskel: negative for myalgias,  back pain and muscle weakness  Neuro: negative for headaches, dizziness, vertigo  Psych:  negative for feelings of anxiety, depression       Objective:   No data found. No intake/output data recorded.   No intake/output data recorded. EXAM:     NEURO-a&o   HEENT-wnl   LUNGS-clear    COR-regular rate and rhythym     ABD-soft , no tenderness, no rebound, good bs     EXT-no edema     Data Review     No results for input(s): WBC, HGB, HCT, PLT, HGBEXT, HCTEXT, PLTEXT in the last 72 hours. No results for input(s): NA, K, CL, CO2, BUN, CREA, GLU, PHOS, CA in the last 72 hours. No results for input(s): SGOT, GPT, AP, TBIL, TP, ALB, GLOB, GGT, AML, LPSE in the last 72 hours. No lab exists for component: AMYP, HLPSE  No results for input(s): INR, PTP, APTT in the last 72 hours. No lab exists for component: INREXT    Patient Active Problem List   Diagnosis Code    Headache(784.0) R51    Severe obesity ([facility] [address] E66.01    Essential hypertension I10      Assessment:   · Colon cancer screening   Plan:   · Colonoscopy today.      Signed By: Olga Yanez MD     6/12/2019  9:59 AM

## 2019-06-12 NOTE — PROCEDURES
Erika Palencia M.D.  [phone]            2019          Colonoscopy Operative Report  Shaquille Zuniga  :  1965  [facility] Medical Record Number:  [id number]      Indications:    Screening colonoscopy     :  Noé Ennis MD    Assistant -- None  Implants -- None    Referring Provider: Elliot Hernandez DO    Sedation:  MAC anesthesia Propofol    Pre-Procedural Exam:      Airway: clear,  No airway problems anticipated  Heart: RRR, without gallops or rubs  Lungs: clear bilaterally without wheezes, crackles, or rhonchi  Abdomen: soft, nontender, nondistended, bowel sounds present  Mental Status: awake, alert and oriented to person, place and time     Procedure Details:  After informed consent was obtained with all risks and benefits of procedure explained and preoperative exam completed, the patient was taken to the endoscopy suite and placed in the left lateral decubitus position. Upon sequential sedation as per above, a digital rectal exam was performed. The Olympus videocolonoscope  was inserted in the rectum and carefully advanced to the cecum, which was identified by the ileocecal valve and appendiceal orifice. The quality of preparation was fair. The colonoscope was slowly withdrawn with careful inspection and evaluation between folds. Retroflexion in the rectum was performed. Findings:     Cecum: normal  Ascending Colon: normal  Transverse Colon: normal  Descending Colon: 1  Sessile polyp(s), the largest 5 mm in size;  Sigmoid: normal  Rectum: normal    Interventions:  1 complete polypectomy were performed using cold biopsy forceps and the polyps were  retrieved    Specimen Removed:  specimen #1, 5 mm in size, located in the descending colon removed by cold biopsy and sent for pathology    Complications: None. EBL:  None. Impression:  1 descending colon polyp removed, otherwise normal exam    Recommendations:  -Await pathology.   -If adenoma is present, repeat colonoscopy in 5 years.   -High fiber diet.    -Resume normal medication(s). Discharge Disposition:  Home in the company of a  when able to ambulate.     Austin Robledo MD  6/12/2019  3:23 PM

## 2019-06-12 NOTE — DISCHARGE INSTRUCTIONS
[facility]. Jennifer Taylor M.D.  [phone]          COLON DISCHARGE INSTRUCTIONS       2019    Gurpreet Kraft  :  1965  [facility] Medical Record Number:  [id number]      COLONOSCOPY FINDINGS:  Your colonoscopy showed: 1 polyp removed. DISCOMFORT:  Redness at IV site- apply warm compress to area; if redness or soreness persist- contact your physician  There may be a slight amount of blood passed from the rectum  Gaseous discomfort- walking, belching will help relieve any discomfort  You may not operate a vehicle for 12 hours  You may not engage in an occupation involving machinery or appliances for rest of today  You may not drink alcoholic beverages for at least 12 hours  Avoid making any critical decisions for at least 24 hour  DIET:   High fiber diet. - however -  remember your colon is empty and a heavy meal will produce gas. Avoid these foods:  vegetables, fried / greasy foods, carbonated drinks for today     ACTIVITY:  You may resume your normal daily activities it is recommended that you spend the remainder of the day resting -  avoid any strenuous activity. CALL M.D. ANY SIGN OF:   Increasing pain, nausea, vomiting  Abdominal distension (swelling)  New increased bleeding (oral or rectal)  Fever (chills)  Pain in chest area  Bloody discharge from nose or mouth   Shortness of breath    Follow-up Instructions:   Call Dr. Benedict Durán if any questions or problems. Telephone # [phone]  Biopsy results will be available in  5 to 7 days  Should have a repeat colonoscopy in 5-10 years. Patient Education        Colon Polyps: Care Instructions  Your Care Instructions    Colon polyps are growths in the colon or the rectum. The cause of most colon polyps is not known, and most people who get them do not have any problems. But a certain kind can turn into cancer.  For this reason, regular testing for colon polyps is important for people age 48 and older and anyone who has an increased risk for colon cancer. Polyps are usually found through routine colon cancer screening tests. Although most colon polyps are not cancerous, they are usually removed and then tested for cancer. Screening for colon cancer saves lives because the cancer can usually be cured if it is caught early. If you have a polyp that is the type that can turn into cancer, you may need more tests to examine your entire colon. The doctor will remove any other polyps that he or she finds, and you will be tested more often. Follow-up care is a key part of your treatment and safety. Be sure to make and go to all appointments, and call your doctor if you are having problems. It's also a good idea to know your test results and keep a list of the medicines you take. How can you care for yourself at home? Regular exams to look for colon polyps are the best way to prevent polyps from turning into colon cancer. These can include stool tests, sigmoidoscopy, colonoscopy, and CT colonography. Talk with your doctor about a testing schedule that is right for you. To prevent polyps  There is no home treatment that can prevent colon polyps. But these steps may help lower your risk for cancer. · Stay active. Being active can help you get to and stay at a healthy weight. Try to exercise on most days of the week. Walking is a good choice. · Eat well. Choose a variety of vegetables, fruits, legumes (such as peas and beans), fish, poultry, and whole grains. · Do not smoke. If you need help quitting, talk to your doctor about stop-smoking programs and medicines. These can increase your chances of quitting for good. · If you drink alcohol, limit how much you drink. Limit alcohol to 2 drinks a day for men and 1 drink a day for women. When should you call for help?   Call your doctor now or seek immediate medical care if:    · You have severe belly pain.     · Your stools are maroon or very bloody.    Watch closely for changes in your health, and be sure to contact your doctor if:    · You have a fever.     · You have nausea or vomiting.     · You have a change in bowel habits (new constipation or diarrhea).     · Your symptoms get worse or are not improving as expected. Where can you learn more? Go to http://carly-jud.info/. Enter 95 [id number] in the search box to learn more about \"Colon Polyps: Care Instructions. \"  Current as of: March 27, 2018  Content Version: 11.9  © 0814-6735 [employer]. Care instructions adapted under license by [employer] (which disclaims liability or warranty for this information). If you have questions about a medical condition or this instruction, always ask your healthcare professional. [address] any warranty or liability for your use of this information.

## 2019-06-12 NOTE — ROUTINE PROCESS
Abbi Lim  1965  [id number]    Situation:  Verbal report received from: Johan Costa RN  Procedure: Procedure(s):  COLONOSCOPY  ENDOSCOPIC POLYPECTOMY    Background:    Preoperative diagnosis: SCREENING  Postoperative diagnosis: Descending polyp    :  Dr. Gerhardt Daunt  Assistant(s): Endoscopy Technician-1: Hermes Babb  Endoscopy RN-1: Tasha Daly RN    Specimens:   ID Type Source Tests Collected by Time Destination   1 : Descending polyp Camden Griffin MD 6/12/2019 1522 Pathology     H. Pylori  no    Assessment:  Intra-procedure medications     Anesthesia gave intra-procedure sedation and medications, see anesthesia flow sheet yes    Intravenous fluids: NS@ KVO     Vital signs stable     Abdominal assessment: round and soft     Recommendation:  Discharge patient per MD order  Family or Friend   Permission to share finding with family or friend yes    Endoscopy discharge instructions have been reviewed and given to patient and spouse. The patient and spouse verbalized understanding and acceptance of instructions.

## 2019-06-12 NOTE — PERIOP NOTES
Procedure being performed under MAC; Sal Chong CRNA at bedside monitoring patient at 4218. See anesthesia notes. Endoscope was pre-cleaned at bedside immediately following procedure by Kemi FAIRCHILD Tech. at 1521. Care of patient assumed from the anesthesia provider at [phone]. Patient tolerated procedure well. Abdomen remains soft and non tender post procedure, no complaints or indication of discomfort noted at this time. Patient transferred to Endoscopy Recovery and report given to recovery nurse Yazan Bose. recovery room RACHEL.

## 2019-06-12 NOTE — ANESTHESIA POSTPROCEDURE EVALUATION
Procedure(s):  COLONOSCOPY  ENDOSCOPIC POLYPECTOMY. MAC    Anesthesia Post Evaluation      Multimodal analgesia: multimodal analgesia not used between 6 hours prior to anesthesia start to PACU discharge  Patient location during evaluation: PACU  Patient participation: complete - patient participated  Level of consciousness: awake  Pain management: adequate  Airway patency: patent  Anesthetic complications: no  Cardiovascular status: acceptable, blood pressure returned to baseline and hemodynamically stable  Respiratory status: acceptable  Hydration status: acceptable  Post anesthesia nausea and vomiting:  controlled      Vitals Value Taken Time   /83 6/12/2019  3:33 PM   Temp     Pulse 78 6/12/2019  3:36 PM   Resp 17 6/12/2019  3:36 PM   SpO2 96 % 6/12/2019  3:36 PM   Vitals shown include unvalidated device data.

## 2019-06-12 NOTE — PERIOP NOTES
Procedure delayed. Patient resting comfortably on stretcher, HOB elevated, VS stable, call bell within reach, wIfe at bedside, waiting for procedure start, will continue to monitor pt. Patient with no discomfort or complaints at this time.

## 2020-07-31 DIAGNOSIS — I10 ESSENTIAL HYPERTENSION: ICD-10-CM

## 2020-08-04 RX ORDER — AMLODIPINE BESYLATE 10 MG/1
TABLET ORAL
Qty: 30 TAB | Refills: 0 | Status: SHIPPED | OUTPATIENT
Start: 2020-08-04 | End: 2020-08-19 | Stop reason: SDUPTHER

## 2020-08-19 ENCOUNTER — OFFICE VISIT (OUTPATIENT)
Dept: FAMILY MEDICINE CLINIC | Age: 55
End: 2020-08-19
Payer: COMMERCIAL

## 2020-08-19 VITALS
WEIGHT: 307 LBS | SYSTOLIC BLOOD PRESSURE: 125 MMHG | OXYGEN SATURATION: 94 % | HEIGHT: 73 IN | DIASTOLIC BLOOD PRESSURE: 87 MMHG | BODY MASS INDEX: 40.69 KG/M2 | RESPIRATION RATE: 18 BRPM | HEART RATE: 91 BPM | TEMPERATURE: 97.9 F

## 2020-08-19 DIAGNOSIS — Z00.00 PHYSICAL EXAM: Primary | ICD-10-CM

## 2020-08-19 DIAGNOSIS — I10 ESSENTIAL HYPERTENSION: ICD-10-CM

## 2020-08-19 DIAGNOSIS — R35.1 BENIGN PROSTATIC HYPERPLASIA WITH NOCTURIA: ICD-10-CM

## 2020-08-19 DIAGNOSIS — N40.1 BENIGN PROSTATIC HYPERPLASIA WITH NOCTURIA: ICD-10-CM

## 2020-08-19 DIAGNOSIS — L91.8 SKIN TAG: ICD-10-CM

## 2020-08-19 PROCEDURE — 17110 DESTRUCTION B9 LES UP TO 14: CPT | Performed by: FAMILY MEDICINE

## 2020-08-19 PROCEDURE — 99396 PREV VISIT EST AGE 40-64: CPT | Performed by: FAMILY MEDICINE

## 2020-08-19 RX ORDER — AMLODIPINE BESYLATE 10 MG/1
TABLET ORAL
Qty: 90 TAB | Refills: 3 | Status: SHIPPED | OUTPATIENT
Start: 2020-08-19 | End: 2022-03-02 | Stop reason: SDUPTHER

## 2020-08-19 RX ORDER — TAMSULOSIN HYDROCHLORIDE 0.4 MG/1
0.4 CAPSULE ORAL DAILY
Qty: 30 CAP | Refills: 5 | Status: SHIPPED | OUTPATIENT
Start: 2020-08-19

## 2020-08-19 NOTE — PROGRESS NOTES
Chief Complaint   Patient presents with    Complete Physical    Labs     he is a 48y.o. year old male who presents for evalution. Patient for blood pressure follow-up blood pressures little bit better than it was previous time over a year ago. Patient taking Norvasc 10 mg p.o. daily. Patient does not experience any side effects at this. Patient has gained approximately 15 pounds since his last visit here. States he wakes up 4-5x a night to urinate. Stastes he doesw not have a diff time initiating stream and feels like he is emptying completely. Pt has a skin tag in his groin that bothers him States his clothes hits it and gets inflamed and irritated. Has one on his post neck that does the same. Pt would like cryo to these today. Reviewed PmHx, RxHx, FmHx, SocHx, AllgHx and updated and dated in the chart.     Review of Systems - negative except as listed above in the HPI    Objective:     Vitals:    08/19/20 0932   BP: 125/87   Pulse: 91   Resp: 18   Temp: 97.9 °F (36.6 °C)   TempSrc: Oral   SpO2: 94%   Weight: 307 lb (139.3 kg)   Height: 6' 1\" (1.854 m)     Physical Examination: General appearance - alert, well appearing, and in no distress  Mental status - alert, oriented to person, place, and time  Eyes - pupils equal and reactive, extraocular eye movements intact  Ears - bilateral TM's and external ear canals normal  Mouth - mucous membranes moist, pharynx normal without lesions  Neck - supple, no significant adenopathy  Lymphatics - no palpable lymphadenopathy, no hepatosplenomegaly  Chest - clear to auscultation, no wheezes, rales or rhonchi, symmetric air entry  Heart - normal rate, regular rhythm, normal S1, S2, no murmurs, rubs, clicks or gallops  Abdomen - soft, nontender, nondistended, no masses or organomegaly  Neurological - alert, oriented, normal speech, no focal findings or movement disorder noted  Musculoskeletal - no joint tenderness, deformity or swelling  Extremities - peripheral pulses normal, no pedal edema, no clubbing or cyanosis  Skin-skin tag posterior right side of neck and left medial thigh  Assessment/ Plan:   Diagnoses and all orders for this visit:    1. Physical exam  -     PSA, DIAGNOSTIC (PROSTATE SPECIFIC AG)  -     METABOLIC PANEL, COMPREHENSIVE  -     LIPID PANEL  -     CBC WITH AUTOMATED DIFF  -     TSH 3RD GENERATION  -     HEMOGLOBIN A1C WITH EAG  -     URINALYSIS W/ RFLX MICROSCOPIC    2. Essential hypertension  -     amLODIPine (NORVASC) 10 mg tablet; Take 1 tablet by mouth once daily    3. Benign prostatic hyperplasia with nocturia  -     tamsulosin (Flomax) 0.4 mg capsule; Take 1 Cap by mouth daily.  -     URINALYSIS W/ RFLX MICROSCOPIC    4. Skin tag  -     DESTRUC BENIGN LESION, UP TO 14 LESIONS  Verbal consent was obtained. 3 freeze thaw cycles using liquid nitrogen/cryotherapy to both skin tags. Patient tolerated the procedure well and aftercare was given. -Patient is in good health  -Discussed with patient cancer risk factors and screens needed  -Patient needs a colonoscopy no  -Labs from previous visits were discussed with patient yes  -Discussed with patient diet and exercise=yes  -Discussed with patient testicular (male)/breast self exam (female)= yes  Follow-up and Dispositions    · Return in about 1 year (around 8/19/2021), or if symptoms worsen or fail to improve. I have discussed the diagnosis with the patient and the intended plan as seen in the above orders. The patient has received an after-visit summary and questions were answered concerning future plans. Pt conveyed understanding.     Medication Side Effects and Warnings were discussed with patient: yes  Patient Labs were reviewed and or requested: yes  Patient Past Records were reviewed and or requested  yes    Patient Instructions        A Healthy Lifestyle: Care Instructions  Your Care Instructions     A healthy lifestyle can help you feel good, stay at a healthy weight, and have plenty of energy for both work and play. A healthy lifestyle is something you can share with your whole family. A healthy lifestyle also can lower your risk for serious health problems, such as high blood pressure, heart disease, and diabetes. You can follow a few steps listed below to improve your health and the health of your family. Follow-up care is a key part of your treatment and safety. Be sure to make and go to all appointments, and call your doctor if you are having problems. It's also a good idea to know your test results and keep a list of the medicines you take. How can you care for yourself at home? · Do not eat too much sugar, fat, or fast foods. You can still have dessert and treats now and then. The goal is moderation. · Start small to improve your eating habits. Pay attention to portion sizes, drink less juice and soda pop, and eat more fruits and vegetables. ? Eat a healthy amount of food. A 3-ounce serving of meat, for example, is about the size of a deck of cards. Fill the rest of your plate with vegetables and whole grains. ? Limit the amount of soda and sports drinks you have every day. Drink more water when you are thirsty. ? Eat at least 5 servings of fruits and vegetables every day. It may seem like a lot, but it is not hard to reach this goal. A serving or helping is 1 piece of fruit, 1 cup of vegetables, or 2 cups of leafy, raw vegetables. Have an apple or some carrot sticks as an afternoon snack instead of a candy bar. Try to have fruits and/or vegetables at every meal.  · Make exercise part of your daily routine. You may want to start with simple activities, such as walking, bicycling, or slow swimming. Try to be active 30 to 60 minutes every day. You do not need to do all 30 to 60 minutes all at once. For example, you can exercise 3 times a day for 10 or 20 minutes.  Moderate exercise is safe for most people, but it is always a good idea to talk to your doctor before starting an exercise program.  · Keep moving. Halley Jama the lawn, work in the garden, or Exhale Fans. Take the stairs instead of the elevator at work. · If you smoke, quit. People who smoke have an increased risk for heart attack, stroke, cancer, and other lung illnesses. Quitting is hard, but there are ways to boost your chance of quitting tobacco for good. ? Use nicotine gum, patches, or lozenges. ? Ask your doctor about stop-smoking programs and medicines. ? Keep trying. In addition to reducing your risk of diseases in the future, you will notice some benefits soon after you stop using tobacco. If you have shortness of breath or asthma symptoms, they will likely get better within a few weeks after you quit. · Limit how much alcohol you drink. Moderate amounts of alcohol (up to 2 drinks a day for men, 1 drink a day for women) are okay. But drinking too much can lead to liver problems, high blood pressure, and other health problems. Family health  If you have a family, there are many things you can do together to improve your health. · Eat meals together as a family as often as possible. · Eat healthy foods. This includes fruits, vegetables, lean meats and dairy, and whole grains. · Include your family in your fitness plan. Most people think of activities such as jogging or tennis as the way to fitness, but there are many ways you and your family can be more active. Anything that makes you breathe hard and gets your heart pumping is exercise. Here are some tips:  ? Walk to do errands or to take your child to school or the bus.  ? Go for a family bike ride after dinner instead of watching TV. Where can you learn more? Go to http://www.gray.com/  Enter J659 in the search box to learn more about \"A Healthy Lifestyle: Care Instructions. \"  Current as of: January 31, 2020               Content Version: 12.5  © 5611-9171 Healthwise, Incorporated.    Care instructions adapted under license by [address] (which disclaims liability or warranty for this information). If you have questions about a medical condition or this instruction, always ask your healthcare professional. [address] any warranty or liability for your use of this information.               Dr. Emmanuel Smith

## 2020-08-19 NOTE — PATIENT INSTRUCTIONS
A Healthy Lifestyle: Care Instructions Your Care Instructions A healthy lifestyle can help you feel good, stay at a healthy weight, and have plenty of energy for both work and play. A healthy lifestyle is something you can share with your whole family. A healthy lifestyle also can lower your risk for serious health problems, such as high blood pressure, heart disease, and diabetes. You can follow a few steps listed below to improve your health and the health of your family. Follow-up care is a key part of your treatment and safety. Be sure to make and go to all appointments, and call your doctor if you are having problems. It's also a good idea to know your test results and keep a list of the medicines you take. How can you care for yourself at home? · Do not eat too much sugar, fat, or fast foods. You can still have dessert and treats now and then. The goal is moderation. · Start small to improve your eating habits. Pay attention to portion sizes, drink less juice and soda pop, and eat more fruits and vegetables. ? Eat a healthy amount of food. A 3-ounce serving of meat, for example, is about the size of a deck of cards. Fill the rest of your plate with vegetables and whole grains. ? Limit the amount of soda and sports drinks you have every day. Drink more water when you are thirsty. ? Eat at least 5 servings of fruits and vegetables every day. It may seem like a lot, but it is not hard to reach this goal. A serving or helping is 1 piece of fruit, 1 cup of vegetables, or 2 cups of leafy, raw vegetables. Have an apple or some carrot sticks as an afternoon snack instead of a candy bar. Try to have fruits and/or vegetables at every meal. 
· Make exercise part of your daily routine. You may want to start with simple activities, such as walking, bicycling, or slow swimming. Try to be active 30 to 60 minutes every day.  You do not need to do all 30 to 60 minutes all at once. For example, you can exercise 3 times a day for 10 or 20 minutes. Moderate exercise is safe for most people, but it is always a good idea to talk to your doctor before starting an exercise program. 
· Keep moving. Tresia Anes the lawn, work in the garden, or Synesis. Take the stairs instead of the elevator at work. · If you smoke, quit. People who smoke have an increased risk for heart attack, stroke, cancer, and other lung illnesses. Quitting is hard, but there are ways to boost your chance of quitting tobacco for good. ? Use nicotine gum, patches, or lozenges. ? Ask your doctor about stop-smoking programs and medicines. ? Keep trying. In addition to reducing your risk of diseases in the future, you will notice some benefits soon after you stop using tobacco. If you have shortness of breath or asthma symptoms, they will likely get better within a few weeks after you quit. · Limit how much alcohol you drink. Moderate amounts of alcohol (up to 2 drinks a day for men, 1 drink a day for women) are okay. But drinking too much can lead to liver problems, high blood pressure, and other health problems. Family health If you have a family, there are many things you can do together to improve your health. · Eat meals together as a family as often as possible. · Eat healthy foods. This includes fruits, vegetables, lean meats and dairy, and whole grains. · Include your family in your fitness plan. Most people think of activities such as jogging or tennis as the way to fitness, but there are many ways you and your family can be more active. Anything that makes you breathe hard and gets your heart pumping is exercise. Here are some tips: 
? Walk to do errands or to take your child to school or the bus. 
? Go for a family bike ride after dinner instead of watching TV. Where can you learn more? Go to http://www.Euroling.Hachiko/ Enter B058 in the search box to learn more about \"A Healthy Lifestyle: Care Instructions. \" Current as of: January 31, 2020               Content Version: 12.5 © 2006-2020 Healthwise, Incorporated. Care instructions adapted under license by [employer] (which disclaims liability or warranty for this information). If you have questions about a medical condition or this instruction, always ask your healthcare professional. [place] [address] any warranty or liability for your use of this information.

## 2020-08-19 NOTE — PROGRESS NOTES
Chief Complaint   Patient presents with    Complete Physical    Labs     Patient presents in office today for CPE and fasting labs. Has a skin tag on the back of his neck that he would like to see about getting removed. No other concerns. 1. Have you been to the ER, urgent care clinic since your last visit? Hospitalized since your last visit? No    2. Have you seen or consulted any other health care providers outside of the [address] [place] [address] since your last visit? Include any pap smears or colon screening.  No    Learning Assessment 3/7/2019   PRIMARY LEARNER Patient   HIGHEST LEVEL OF EDUCATION - PRIMARY LEARNER  GRADUATED HIGH SCHOOL OR GED   BARRIERS PRIMARY LEARNER NONE   CO-LEARNER CAREGIVER No   PRIMARY LANGUAGE ENGLISH   LEARNER PREFERENCE PRIMARY LISTENING   ANSWERED BY self   RELATIONSHIP SELF

## 2020-08-20 LAB
ALBUMIN SERPL-MCNC: 4.3 G/DL (ref 3.8–4.9)
ALBUMIN/GLOB SERPL: 1.4 {RATIO} (ref 1.2–2.2)
ALP SERPL-CCNC: 61 IU/L (ref 39–117)
ALT SERPL-CCNC: 35 IU/L (ref 0–44)
APPEARANCE UR: CLEAR
AST SERPL-CCNC: 24 IU/L (ref 0–40)
BASOPHILS # BLD AUTO: 0.1 X10E3/UL (ref 0–0.2)
BASOPHILS NFR BLD AUTO: 1 %
BILIRUB SERPL-MCNC: 0.3 MG/DL (ref 0–1.2)
BILIRUB UR QL STRIP: NEGATIVE
BUN SERPL-MCNC: 13 MG/DL (ref 6–24)
BUN/CREAT SERPL: 14 (ref 9–20)
CALCIUM SERPL-MCNC: 8.9 MG/DL (ref 8.7–10.2)
CHLORIDE SERPL-SCNC: 102 MMOL/L (ref 96–106)
CHOLEST SERPL-MCNC: 168 MG/DL (ref 100–199)
CO2 SERPL-SCNC: 25 MMOL/L (ref 20–29)
COLOR UR: YELLOW
CREAT SERPL-MCNC: 0.94 MG/DL (ref 0.76–1.27)
EOSINOPHIL # BLD AUTO: 0.3 X10E3/UL (ref 0–0.4)
EOSINOPHIL NFR BLD AUTO: 4 %
ERYTHROCYTE [DISTWIDTH] IN BLOOD BY AUTOMATED COUNT: 12.7 % (ref 11.6–15.4)
EST. AVERAGE GLUCOSE BLD GHB EST-MCNC: 123 MG/DL
GLOBULIN SER CALC-MCNC: 3.1 G/DL (ref 1.5–4.5)
GLUCOSE SERPL-MCNC: 112 MG/DL (ref 65–99)
GLUCOSE UR QL: NEGATIVE
HBA1C MFR BLD: 5.9 % (ref 4.8–5.6)
HCT VFR BLD AUTO: 42.4 % (ref 37.5–51)
HDLC SERPL-MCNC: 38 MG/DL
HGB BLD-MCNC: 14.1 G/DL (ref 13–17.7)
HGB UR QL STRIP: NEGATIVE
IMM GRANULOCYTES # BLD AUTO: 0 X10E3/UL (ref 0–0.1)
IMM GRANULOCYTES NFR BLD AUTO: 1 %
INTERPRETATION, 910389: NORMAL
KETONES UR QL STRIP: NEGATIVE
LDLC SERPL CALC-MCNC: 98 MG/DL (ref 0–99)
LEUKOCYTE ESTERASE UR QL STRIP: NEGATIVE
LYMPHOCYTES # BLD AUTO: 2.5 X10E3/UL (ref 0.7–3.1)
LYMPHOCYTES NFR BLD AUTO: 34 %
MCH RBC QN AUTO: 29 PG (ref 26.6–33)
MCHC RBC AUTO-ENTMCNC: 33.3 G/DL (ref 31.5–35.7)
MCV RBC AUTO: 87 FL (ref 79–97)
MICRO URNS: NORMAL
MONOCYTES # BLD AUTO: 0.7 X10E3/UL (ref 0.1–0.9)
MONOCYTES NFR BLD AUTO: 10 %
NEUTROPHILS # BLD AUTO: 3.8 X10E3/UL (ref 1.4–7)
NEUTROPHILS NFR BLD AUTO: 50 %
NITRITE UR QL STRIP: NEGATIVE
PH UR STRIP: 6 [PH] (ref 5–7.5)
PLATELET # BLD AUTO: 222 X10E3/UL (ref 150–450)
POTASSIUM SERPL-SCNC: 3.8 MMOL/L (ref 3.5–5.2)
PROT SERPL-MCNC: 7.4 G/DL (ref 6–8.5)
PROT UR QL STRIP: NEGATIVE
PSA SERPL-MCNC: 1.5 NG/ML (ref 0–4)
RBC # BLD AUTO: 4.87 X10E6/UL (ref 4.14–5.8)
SODIUM SERPL-SCNC: 141 MMOL/L (ref 134–144)
SP GR UR: 1.01 (ref 1–1.03)
TRIGL SERPL-MCNC: 162 MG/DL (ref 0–149)
TSH SERPL DL<=0.005 MIU/L-ACNC: 0.71 UIU/ML (ref 0.45–4.5)
UROBILINOGEN UR STRIP-MCNC: 1 MG/DL (ref 0.2–1)
VLDLC SERPL CALC-MCNC: 32 MG/DL (ref 5–40)
WBC # BLD AUTO: 7.4 X10E3/UL (ref 3.4–10.8)

## 2020-08-20 NOTE — PROGRESS NOTES
Pt is prediabetic putting at increased risk for diabetes. Pt to work on low carb/sugar diet along with exercise and weight loss. Recheck 6 months. Otherwise labs are fine.

## 2022-02-26 ENCOUNTER — NURSE TRIAGE (OUTPATIENT)
Dept: OTHER | Facility: CLINIC | Age: 57
End: 2022-02-26

## 2022-02-26 NOTE — TELEPHONE ENCOUNTER
Received call from [facility] at [facility] with Red Flag Complaint. Subjective: Caller states \"For the past week I have been noticing my 's BP has been getting higher, but he doesn't have any symptoms\"     Somewhat limited Triage due to wife calling on behalf of patient    Current Symptoms: /102, HR 80    Onset: 1 week ago; gradual    Associated Symptoms: NA    Pain Severity: Pt's wife states no pain at this time    Temperature: None at this time    What has been tried: Taking routine Amlodipine 10mg    LMP: NA Pregnant: NA    Recommended disposition: See PCP within 3 Days    Care advice provided, patient verbalizes understanding; denies any other questions or concerns; instructed to call back for any new or worsening symptoms. Patient/Caller agrees with recommended disposition; writer provided warm transfer to Wes at [facility] for appointment scheduling    Attention Provider: Thank you for allowing me to participate in the care of your patient. The patient was connected to triage in response to information provided to the [facility]. Please do not respond through this encounter as the response is not directed to a shared pool.       Reason for Disposition   Systolic BP  >= 297 OR Diastolic >= 488    Protocols used: BLOOD PRESSURE - HIGH-ADULT-AH

## 2022-03-02 ENCOUNTER — OFFICE VISIT (OUTPATIENT)
Dept: FAMILY MEDICINE CLINIC | Age: 57
End: 2022-03-02
Payer: COMMERCIAL

## 2022-03-02 VITALS
HEART RATE: 85 BPM | DIASTOLIC BLOOD PRESSURE: 86 MMHG | RESPIRATION RATE: 16 BRPM | TEMPERATURE: 97.6 F | OXYGEN SATURATION: 96 % | WEIGHT: 315 LBS | HEIGHT: 73 IN | BODY MASS INDEX: 41.75 KG/M2 | SYSTOLIC BLOOD PRESSURE: 153 MMHG

## 2022-03-02 DIAGNOSIS — I10 ESSENTIAL HYPERTENSION: Primary | ICD-10-CM

## 2022-03-02 DIAGNOSIS — R06.81 APNEIC: ICD-10-CM

## 2022-03-02 DIAGNOSIS — R06.83 SNORING: ICD-10-CM

## 2022-03-02 PROCEDURE — 99214 OFFICE O/P EST MOD 30 MIN: CPT | Performed by: FAMILY MEDICINE

## 2022-03-02 RX ORDER — CHLORTHALIDONE 25 MG/1
25 TABLET ORAL DAILY
Qty: 30 TABLET | Refills: 1 | Status: SHIPPED | OUTPATIENT
Start: 2022-03-02 | End: 2022-05-24

## 2022-03-02 RX ORDER — AMLODIPINE BESYLATE 10 MG/1
TABLET ORAL
Qty: 90 TABLET | Refills: 3 | Status: SHIPPED | OUTPATIENT
Start: 2022-03-02

## 2022-03-02 NOTE — PROGRESS NOTES
Chief Complaint   Patient presents with    Hypertension     Patient presents in office today with c/o elevated BP. States that it has been running 150s-180s/110s. No other concerns. 1. Have you been to the ER, urgent care clinic since your last visit? Hospitalized since your last visit? No    2. Have you seen or consulted any other health care providers outside of the [address] [place] [address] since your last visit? Include any pap smears or colon screening.  No    Learning Assessment 3/7/2019   PRIMARY LEARNER Patient   HIGHEST LEVEL OF EDUCATION - PRIMARY LEARNER  GRADUATED HIGH SCHOOL OR GED   BARRIERS PRIMARY LEARNER NONE   CO-LEARNER CAREGIVER No   PRIMARY LANGUAGE ENGLISH   LEARNER PREFERENCE PRIMARY LISTENING   ANSWERED BY self   RELATIONSHIP SELF

## 2022-03-02 NOTE — PROGRESS NOTES
Progress Note    he is a 55y.o. year old male who presents for evalution. Subjective:     Pt ehre for elevated BP states has been running 150's/100's. Currently on norvasc 10 and tolerates fine. Wife has told him he snores loudly and seems like he stops breathing for a few seconds at times. Reviewed PmHx, RxHx, FmHx, SocHx, AllgHx and updated and dated in the chart. Review of Systems - negative except as listed above in the HPI    Objective:     Vitals:    03/02/22 0837   BP: (!) 153/86   Pulse: 85   Resp: 16   Temp: 97.6 °F (36.4 °C)   TempSrc: Oral   SpO2: 96%   Weight: 318 lb (144.2 kg)   Height: 6' 1\" (1.854 m)       Current Outpatient Medications   Medication Sig    chlorthalidone (HYGROTON) 25 mg tablet Take 1 Tablet by mouth daily.  amLODIPine (NORVASC) 10 mg tablet Take 1 tablet by mouth once daily    tamsulosin (Flomax) 0.4 mg capsule Take 1 Cap by mouth daily.  aspirin delayed-release 81 mg tablet Take  by mouth daily. (Patient not taking: Reported on 3/2/2022)     No current facility-administered medications for this visit. Physical Examination: General appearance - alert, well appearing, and in no distress  Chest - clear to auscultation, no wheezes, rales or rhonchi, symmetric air entry  Heart - normal rate, regular rhythm, normal S1, S2, no murmurs, rubs, clicks or gallops      Assessment/ Plan:   Diagnoses and all orders for this visit:    1. Essential hypertension  -     chlorthalidone (HYGROTON) 25 mg tablet; Take 1 Tablet by mouth daily. -     amLODIPine (NORVASC) 10 mg tablet; Take 1 tablet by mouth once daily    2. Snoring  -     SLEEP MEDICINE REFERRAL    3. Apneic  -     SLEEP MEDICINE REFERRAL       Follow-up and Dispositions    · Return in about 2 weeks (around 3/16/2022), or if symptoms worsen or fail to improve. I have discussed the diagnosis with the patient and the intended plan as seen in the above orders.   The patient has received an after-visit summary and questions were answered concerning future plans. Pt conveyed understanding of plan.     Medication Side Effects and Warnings were discussed with patient    An electronic signature was used to authenticate this note  Javan Mccullough DO

## 2022-03-10 ENCOUNTER — OFFICE VISIT (OUTPATIENT)
Dept: SLEEP MEDICINE | Age: 57
End: 2022-03-10
Payer: COMMERCIAL

## 2022-03-10 ENCOUNTER — DOCUMENTATION ONLY (OUTPATIENT)
Dept: SLEEP MEDICINE | Age: 57
End: 2022-03-10

## 2022-03-10 VITALS
OXYGEN SATURATION: 94 % | DIASTOLIC BLOOD PRESSURE: 94 MMHG | HEART RATE: 82 BPM | BODY MASS INDEX: 40.69 KG/M2 | WEIGHT: 307 LBS | SYSTOLIC BLOOD PRESSURE: 138 MMHG | HEIGHT: 73 IN

## 2022-03-10 DIAGNOSIS — G47.33 OSA (OBSTRUCTIVE SLEEP APNEA): Primary | ICD-10-CM

## 2022-03-10 DIAGNOSIS — E66.01 MORBID OBESITY WITH BMI OF 40.0-44.9, ADULT (HCC): ICD-10-CM

## 2022-03-10 PROCEDURE — 99204 OFFICE O/P NEW MOD 45 MIN: CPT | Performed by: SPECIALIST
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
## 2022-03-10 NOTE — PATIENT INSTRUCTIONS
Learning About Sleep Apnea  What is it? Sleep apnea means that breathing stops for short periods during sleep. When you stop breathing or have reduced airflow into your lungs during sleep, you don't sleep well and you can be very tired during the day. The oxygen levels in your blood may go down, and carbon dioxide levels go up. It may lead to other problems, such as high blood pressure and heart disease. Sleep apnea can range from mild to severe, based on how often breathing stops during sleep. For adults, breathing may stop as few as 5 times an hour (mild apnea) to 30 or more times an hour (severe apnea). Obstructive sleep apnea is the most common type. This most often occurs because your airways are blocked or partly blocked. Central sleep apnea is less common. It happens when the brain has trouble controlling breathing. Some people have both types. That's called complex sleep apnea. What are the symptoms? There are symptoms of sleep apnea that you may notice and symptoms that others may notice when you're asleep. Symptoms you may notice include:  · Feeling extremely sleepy during the day. · Feeling unrefreshed or tired after a night's sleep. · Problems with memory and concentration, or mood changes. · Morning headaches. · Getting up often during the night to urinate. · A dry mouth or sore throat in the morning. If you have a bed partner, they may notice that you:  · Have episodes of not breathing. · Snore loudly. Almost all people who have sleep apnea snore. But not all people who snore have sleep apnea. · Toss and turn during sleep. · Have nighttime choking or gasping spells. How is it diagnosed? Your doctor will probably do a physical exam and ask about your past health. The doctor may also ask you or your bed partner about your snoring and sleep behavior and how tired you feel during the day. Your doctor may suggest a sleep study.  Sleep studies are a series of tests that look at what happens to the body during sleep. They check for how often you stop breathing or have too little air flowing into your lungs during sleep. They also find out how much oxygen you have in your blood during sleep. A sleep study may take place in your home. Or it might take place at a sleep center, where you will spend the night. If your sleep apnea doesn't improve with treatment, you may have more tests to find out what's causing it. How is it treated? You may be able to help treat sleep apnea by making some lifestyle changes. You could try to lose weight, sleep on your side, and avoid alcohol and medicines like sedatives before bed. Sleep apnea is often treated with machines that deliver air through a mask to help keep your airways open. These include:  · Continuous positive airway pressure (CPAP). This increases air pressure in your throat. It keeps your airway open when you breathe in. It's the most common device. · Bilevel positive airway pressure (BPAP). You may also hear this called BiPAP. This uses different air pressures when you breathe in and out. · Adaptive servo ventilation (ASV). It senses pauses in breathing and adjusts air pressure. It's mostly used for central sleep apnea. You can also try oral breathing devices or nasal devices. If your tonsils or other tissues are blocking your airway, your doctor may suggest surgery to open the airway. How can you care for yourself at home? · Lose weight, if needed. · Sleep on your side. It may help mild apnea. · Avoid alcohol and medicines such as sleeping pills, opioids, or sedatives before bed. · Don't smoke. If you need help quitting, talk to your doctor. · Prop up the head of your bed. · Treat breathing problems, such as a stuffy nose, that are caused by a cold or allergies. · Try a continuous positive airway pressure (CPAP) breathing machine if your doctor recommends it.   · If CPAP doesn't work for you, ask your doctor if you can try other masks, settings, or breathing machines. · Try oral breathing devices or other nasal devices. · Talk to your doctor if your nose feels dry or bleeds, or if it gets runny or stuffy when you use a breathing machine. · Tell your doctor if you're sleepy during the day and it affects your daily life. Don't drive or operate machinery when you're drowsy. Where can you learn more? Go to http://www.gray.com/  Enter S121 in the search box to learn more about \"Learning About Sleep Apnea. \"  Current as of: July 6, 2021               Content Version: 13.2  © 6867-6626 [employer]. Care instructions adapted under license by [employer] (which disclaims liability or warranty for this information). If you have questions about a medical condition or this instruction, always ask your healthcare professional. [place] [address] any warranty or liability for your use of this information.

## 2022-03-10 NOTE — PROGRESS NOTES
Faxed over [address] form to [facility] insurance primary fax on 3/10/2022.     Primary Fax: [phone]  Secondary Fax: [phone]

## 2022-03-19 PROBLEM — I10 ESSENTIAL HYPERTENSION: Status: ACTIVE | Noted: 2019-03-29

## 2022-03-19 PROBLEM — E66.01 SEVERE OBESITY (HCC): Status: ACTIVE | Noted: 2019-03-07

## 2022-03-31 ENCOUNTER — OFFICE VISIT (OUTPATIENT)
Dept: SLEEP MEDICINE | Age: 57
End: 2022-03-31

## 2022-03-31 ENCOUNTER — HOSPITAL ENCOUNTER (OUTPATIENT)
Dept: SLEEP MEDICINE | Age: 57
Discharge: HOME | End: 2022-03-31
Payer: COMMERCIAL

## 2022-03-31 DIAGNOSIS — G47.33 OSA (OBSTRUCTIVE SLEEP APNEA): Primary | ICD-10-CM

## 2022-03-31 PROCEDURE — 95806 SLEEP STUDY UNATT&RESP EFFT: CPT | Performed by: SPECIALIST

## 2022-03-31 NOTE — PROGRESS NOTES
[address] [facility]. [place], [address]  Tel.  [phone]  Fax. [address] [facility]  [place], [address] [facility]  Tel.  [phone]  Fax. [phone] [address] Maisha Alvarez   Tel.  [phone]  Fax. [phone]       S>Douglas Louis is a 55 y.o. male seen today to receive a home sleep testing unit (HST). · Patient was educated on proper hookup and operation of the HST. · Instruction forms and documentation were reviewed and signed. · The patient demonstrated good understanding of the HST.    O>    There were no vitals taken for this visit. A>  No diagnosis found. P>  · General information regarding operations and maintenance of the device was provided. · He was provided information on sleep apnea including coresponding risk factors and the importance of proper treatment. · Follow-up appointment was made to return the HST. He will be contacted once the results have been reviewed. · He was asked to contact our office for any problems regarding his home sleep test study.     · [facility] 9363

## 2022-04-06 ENCOUNTER — APPOINTMENT (OUTPATIENT)
Dept: CT IMAGING | Age: 57
End: 2022-04-06
Attending: EMERGENCY MEDICINE
Payer: COMMERCIAL

## 2022-04-06 ENCOUNTER — HOSPITAL ENCOUNTER (EMERGENCY)
Age: 57
Discharge: HOME | End: 2022-04-06
Attending: EMERGENCY MEDICINE
Payer: COMMERCIAL

## 2022-04-06 VITALS
RESPIRATION RATE: 20 BRPM | BODY MASS INDEX: 40.42 KG/M2 | DIASTOLIC BLOOD PRESSURE: 79 MMHG | OXYGEN SATURATION: 93 % | SYSTOLIC BLOOD PRESSURE: 126 MMHG | WEIGHT: 305 LBS | HEIGHT: 73 IN | TEMPERATURE: 97.7 F | HEART RATE: 89 BPM

## 2022-04-06 DIAGNOSIS — R19.7 DIARRHEA, UNSPECIFIED TYPE: ICD-10-CM

## 2022-04-06 DIAGNOSIS — R11.10 VOMITING, UNSPECIFIED VOMITING TYPE, UNSPECIFIED WHETHER NAUSEA PRESENT: Primary | ICD-10-CM

## 2022-04-06 LAB
ALBUMIN SERPL-MCNC: 4 G/DL (ref 3.5–5)
ALBUMIN/GLOB SERPL: 0.9 {RATIO} (ref 1.1–2.2)
ALP SERPL-CCNC: 53 U/L (ref 45–117)
ALT SERPL-CCNC: 47 U/L (ref 12–78)
ANION GAP SERPL CALC-SCNC: 12 MMOL/L (ref 5–15)
AST SERPL W P-5'-P-CCNC: 21 U/L (ref 15–37)
BASOPHILS # BLD: 0 K/UL (ref 0–0.1)
BASOPHILS NFR BLD: 0 % (ref 0–1)
BILIRUB DIRECT SERPL-MCNC: 0.2 MG/DL (ref 0–0.2)
BILIRUB SERPL-MCNC: 1.1 MG/DL (ref 0.2–1)
BUN SERPL-MCNC: 26 MG/DL (ref 6–20)
BUN/CREAT SERPL: 17 (ref 12–20)
CA-I BLD-MCNC: 8.7 MG/DL (ref 8.5–10.1)
CHLORIDE SERPL-SCNC: 104 MMOL/L (ref 97–108)
CO2 SERPL-SCNC: 26 MMOL/L (ref 21–32)
CREAT SERPL-MCNC: 1.57 MG/DL (ref 0.7–1.3)
DIFFERENTIAL METHOD BLD: ABNORMAL
EOSINOPHIL # BLD: 0.1 K/UL (ref 0–0.4)
EOSINOPHIL NFR BLD: 0 % (ref 0–7)
ERYTHROCYTE [DISTWIDTH] IN BLOOD BY AUTOMATED COUNT: 13.3 % (ref 11.5–14.5)
ETHANOL SERPL-MCNC: 4 MG/DL
GLOBULIN SER CALC-MCNC: 4.3 G/DL (ref 2–4)
GLUCOSE SERPL-MCNC: 147 MG/DL (ref 65–100)
HCT VFR BLD AUTO: 44.5 % (ref 36.6–50.3)
HGB BLD-MCNC: 14.6 G/DL (ref 12.1–17)
IMM GRANULOCYTES # BLD AUTO: 0 K/UL (ref 0–0.04)
IMM GRANULOCYTES NFR BLD AUTO: 0 % (ref 0–0.5)
LYMPHOCYTES # BLD: 0.8 K/UL (ref 0.8–3.5)
LYMPHOCYTES NFR BLD: 6 % (ref 12–49)
MCH RBC QN AUTO: 29.4 PG (ref 26–34)
MCHC RBC AUTO-ENTMCNC: 32.8 G/DL (ref 30–36.5)
MCV RBC AUTO: 89.7 FL (ref 80–99)
MONOCYTES # BLD: 0.7 K/UL (ref 0–1)
MONOCYTES NFR BLD: 6 % (ref 5–13)
NEUTS SEG # BLD: 10.8 K/UL (ref 1.8–8)
NEUTS SEG NFR BLD: 86 % (ref 32–75)
NRBC # BLD: 0 K/UL (ref 0–0.01)
NRBC BLD-RTO: 0 PER 100 WBC
PLATELET # BLD AUTO: 271 K/UL (ref 150–400)
PMV BLD AUTO: 9.3 FL (ref 8.9–12.9)
POTASSIUM SERPL-SCNC: 3.2 MMOL/L (ref 3.5–5.1)
PROT SERPL-MCNC: 8.3 G/DL (ref 6.4–8.2)
RBC # BLD AUTO: 4.96 M/UL (ref 4.1–5.7)
SODIUM SERPL-SCNC: 142 MMOL/L (ref 136–145)
WBC # BLD AUTO: 12.6 K/UL (ref 4.1–11.1)

## 2022-04-06 PROCEDURE — 74176 CT ABD & PELVIS W/O CONTRAST: CPT

## 2022-04-06 PROCEDURE — 74011250637 HC RX REV CODE- 250/637: Performed by: EMERGENCY MEDICINE

## 2022-04-06 PROCEDURE — 80076 HEPATIC FUNCTION PANEL: CPT

## 2022-04-06 PROCEDURE — 74011250636 HC RX REV CODE- 250/636: Performed by: EMERGENCY MEDICINE

## 2022-04-06 PROCEDURE — 80048 BASIC METABOLIC PNL TOTAL CA: CPT

## 2022-04-06 PROCEDURE — 82077 ASSAY SPEC XCP UR&BREATH IA: CPT

## 2022-04-06 PROCEDURE — 99284 EMERGENCY DEPT VISIT MOD MDM: CPT

## 2022-04-06 PROCEDURE — 96374 THER/PROPH/DIAG INJ IV PUSH: CPT

## 2022-04-06 PROCEDURE — 85025 COMPLETE CBC W/AUTO DIFF WBC: CPT

## 2022-04-06 PROCEDURE — 36415 COLL VENOUS BLD VENIPUNCTURE: CPT

## 2022-04-06 RX ORDER — ONDANSETRON 4 MG/1
4 TABLET, FILM COATED ORAL
Qty: 12 TABLET | Refills: 0 | Status: SHIPPED | OUTPATIENT
Start: 2022-04-06 | End: 2022-04-10

## 2022-04-06 RX ORDER — ONDANSETRON 2 MG/ML
4 INJECTION INTRAMUSCULAR; INTRAVENOUS
Status: COMPLETED | OUTPATIENT
Start: 2022-04-06 | End: 2022-04-06

## 2022-04-06 RX ORDER — POTASSIUM CHLORIDE 20 MEQ/1
40 TABLET, EXTENDED RELEASE ORAL ONCE
Status: COMPLETED | OUTPATIENT
Start: 2022-04-06 | End: 2022-04-06

## 2022-04-06 RX ADMIN — POTASSIUM CHLORIDE 40 MEQ: 20 TABLET, EXTENDED RELEASE ORAL at 03:47

## 2022-04-06 RX ADMIN — ONDANSETRON HYDROCHLORIDE 4 MG: 2 SOLUTION INTRAMUSCULAR; INTRAVENOUS at 01:49

## 2022-04-06 RX ADMIN — SODIUM CHLORIDE 1000 ML: 9 INJECTION, SOLUTION INTRAVENOUS at 01:52
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
## 2022-04-06 NOTE — ED NOTES
Patient discharged home with spouse. No sign of distress. No vomiting. No complaints of pain. Patient ambulated to private vehicle.

## 2022-04-06 NOTE — Clinical Note
[facility] EMERGENCY DEPARTMENT  [address] [address]  [phone]    Work/School Note    Date: 4/6/2022    To Whom It May concern:    Claudette David was seen and treated today in the emergency room by the following provider(s):  Attending Provider: Chris Velazquez MD.      Claudette David is excused from work/school on 4/6/2022 through 4/8/2022. He is medically clear to return to work/school on 4/9/2022.          Sincerely,          Anyi Ceron MD

## 2022-04-06 NOTE — ED TRIAGE NOTES
Patient presents with abdominal pain, vomiting and diarrhea. Symptoms began 2 days ago. Unable to keep food or water down. No medications taken for pain.

## 2022-04-06 NOTE — ED NOTES
Patient had sleep apnea testing performed. No results yet per patient. Patient drops in 70-80% range when sleeping. Patient back to 96% when awake. Placed on 2L NC. Will continue to monitor.

## 2022-04-07 ENCOUNTER — TELEPHONE (OUTPATIENT)
Dept: SLEEP MEDICINE | Age: 57
End: 2022-04-07

## 2022-04-07 DIAGNOSIS — G47.33 OSA (OBSTRUCTIVE SLEEP APNEA): Primary | ICD-10-CM

## 2022-04-28 NOTE — TELEPHONE ENCOUNTER
HSAT demonstrated severe sleep disordered breathing characterized by an overall AHI of 72.3/h associated with minimal SPO2 of 49%. Snoring during 42.2%. Results have been reviewed with the patient. Recommendation: Titration study.

## 2022-05-24 DIAGNOSIS — I10 ESSENTIAL HYPERTENSION: ICD-10-CM

## 2022-05-24 RX ORDER — CHLORTHALIDONE 25 MG/1
TABLET ORAL
Qty: 30 TABLET | Refills: 0 | Status: SHIPPED | OUTPATIENT
Start: 2022-05-24 | End: 2022-06-24

## 2022-06-21 ENCOUNTER — HOSPITAL ENCOUNTER (OUTPATIENT)
Dept: SLEEP MEDICINE | Age: 57
Discharge: HOME | End: 2022-06-21
Payer: COMMERCIAL

## 2022-06-21 PROCEDURE — 95811 POLYSOM 6/>YRS CPAP 4/> PARM: CPT | Performed by: SPECIALIST

## 2022-06-22 ENCOUNTER — DOCUMENTATION ONLY (OUTPATIENT)
Dept: SLEEP MEDICINE | Age: 57
End: 2022-06-22

## 2022-06-22 ENCOUNTER — TELEPHONE (OUTPATIENT)
Dept: SLEEP MEDICINE | Age: 57
End: 2022-06-22

## 2022-06-22 VITALS
HEART RATE: 87 BPM | DIASTOLIC BLOOD PRESSURE: 81 MMHG | TEMPERATURE: 98.3 F | OXYGEN SATURATION: 94 % | SYSTOLIC BLOOD PRESSURE: 139 MMHG

## 2022-06-22 DIAGNOSIS — G47.33 OSA (OBSTRUCTIVE SLEEP APNEA): Primary | ICD-10-CM

## 2022-06-22 NOTE — PROGRESS NOTES
[address] [place]. [place], [address]  Tel.  [phone]  Fax. 100 [facility] 60  [place], [address] [facility]  Tel.  [phone]  Fax. [phone] [address]  Tel.  [phone]  Fax. [phone]     Sleep Study Technical Notes        PRE-Test:  · Chris Alvarez (: 1965) arrived in the lobby. Patient was greeted, temperature checked (98.3 ) and screening questions asked. The patient was taken directly to their assigned room. BP (139/81) and SpO2 (94%) were taken. Procedure was explained to the patient and questions were answered. Pt expressed understanding. Electrodes were applied without incident. The patient was placed in bed and the study was started. Acquisition Notes:  · Lights off: 10:11pm  · Sleep onset: 10:13pm  · Lights on: 5:34am  · Respiratory events: Hypopneas, Obstructive and Central apneas noted  · ECG:  NSR  · PAP titration: 10/6cm H2O - 24/20cm H2O  · Mask(s) Used: Respironics Dreamwear Medium Hybrid FFM  · Other comments: The study ordered was a CPAP Titration. A desensitization was done prior to the study and the Pt had more difficulty on CPAP and was switched to BiPAP which he found more comfortable. The hook-up was completed without issue. The Pt was started on CPAP at 10/6cm H2O on a Respironics Dreamwear Medium hybrid full-face mask. The Pt was placed in bed and lights were out at 10:11pm. Sleep onset occurred around 10:13pm.    During the study stages 1, 2, and REM were noted. The Pt slept supine and on both sides. Moderate snoring was heard. The Pt appeared to show signs of sleep-disordered breathing as hypopneas, central and obstructive apneas were noted. PAP was titrated accordingly to a final resting pressure of 24/20cm H2O. Lights were on at 5:34am.    POST Test:  Patient was awakened. Electrodes were removed. The patient was discharged after completing the Post Questionnaire.  Patient stated that they were alert and ok to drive. Equipment and room cleaned per infection control policy.

## 2022-06-23 DIAGNOSIS — I10 ESSENTIAL HYPERTENSION: ICD-10-CM

## 2022-06-24 RX ORDER — CHLORTHALIDONE 25 MG/1
TABLET ORAL
Qty: 30 TABLET | Refills: 0 | Status: SHIPPED | OUTPATIENT
Start: 2022-06-24 | End: 2022-08-23

## 2022-07-11 NOTE — TELEPHONE ENCOUNTER
Titration study performed. HSAT demonstrated severe sleep disordered breathing characterized by an overall AHI of 72.3/h associated with minimal SPO2 of 49%. Snoring during 42.2%. 442 minutes recorded of which 338 minutes spent asleep with a sleep efficiency of 76.5%. Sleep onset at 44 minutes; REM onset at 112.5 minutes with total REM representing 26.8% of sleep time. 90 respiratory events occurred of which 34 hypopnea and 56 apnea (28 central, 6 mixed, 22 obstructive). Overall AHI 16/h. Events more prominently supine with a supine related AHI of 21.2/h. Minimal SaO2 67%. BiPAP initiated 12/8 cm and increased to 24/20 cm. At the final pressure settin.1 minutes recorded of which 80.1 minutes asleep and 28 minutes in REM. AHI 6.7/h,    Impression: Severe sleep disordered breathing with events continuing supine. Recommendation: BiPAP 22/18 cm, measures to limit supine sleep. Sleep technologist: Please advise patient of study results. Order entered for BiPAP 22/18 cm. Please review measures to limit supine sleep (slumber bump or other positional modalities).

## 2022-07-12 NOTE — TELEPHONE ENCOUNTER
Reviewed sleep study results with patient. He expressed understanding and is willing to proceed with a trial of BIPAP.

## 2022-07-15 ENCOUNTER — DOCUMENTATION ONLY (OUTPATIENT)
Dept: SLEEP MEDICINE | Age: 57
End: 2022-07-15

## 2022-08-22 DIAGNOSIS — I10 ESSENTIAL HYPERTENSION: ICD-10-CM

## 2022-08-22 NOTE — LETTER
8/23/2022 2:41 PM    Mr. Dre Barraza  [facility] [phone],   It's that time again! We have received a request for chlorthalidone, however, we are unable to continue prescribing chlorthalidone unless you follow up in our office. Your health is of the utmost importance to us and we would like to ensure that you received the most appropriate medications and dosages for your condition. In order to do this a face to face discussion, physical exam and/or blood work is required at regular intervals. Please contact our office at your earliest convenience to make an appointment.         Sincerely,      Abner Valencia, DO

## 2022-08-23 RX ORDER — CHLORTHALIDONE 25 MG/1
TABLET ORAL
Qty: 30 TABLET | Refills: 0 | Status: SHIPPED | OUTPATIENT
Start: 2022-08-23 | End: 2022-11-02 | Stop reason: SDUPTHER

## 2022-09-19 ENCOUNTER — TELEPHONE (OUTPATIENT)
Dept: SLEEP MEDICINE | Age: 57
End: 2022-09-19

## 2022-09-20 ENCOUNTER — OFFICE VISIT (OUTPATIENT)
Dept: SLEEP MEDICINE | Age: 57
End: 2022-09-20
Payer: COMMERCIAL

## 2022-09-20 VITALS
DIASTOLIC BLOOD PRESSURE: 95 MMHG | BODY MASS INDEX: 40.25 KG/M2 | OXYGEN SATURATION: 98 % | HEIGHT: 73 IN | WEIGHT: 303.7 LBS | SYSTOLIC BLOOD PRESSURE: 146 MMHG | HEART RATE: 71 BPM

## 2022-09-20 DIAGNOSIS — G47.33 OSA (OBSTRUCTIVE SLEEP APNEA): Primary | ICD-10-CM

## 2022-09-20 DIAGNOSIS — E66.01 MORBID OBESITY (HCC): ICD-10-CM

## 2022-09-20 PROCEDURE — 99213 OFFICE O/P EST LOW 20 MIN: CPT | Performed by: SPECIALIST
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
## 2022-11-02 ENCOUNTER — OFFICE VISIT (OUTPATIENT)
Dept: FAMILY MEDICINE CLINIC | Age: 57
End: 2022-11-02
Payer: COMMERCIAL

## 2022-11-02 VITALS
HEART RATE: 65 BPM | TEMPERATURE: 98.1 F | RESPIRATION RATE: 18 BRPM | BODY MASS INDEX: 38.3 KG/M2 | WEIGHT: 289 LBS | OXYGEN SATURATION: 96 % | HEIGHT: 73 IN | DIASTOLIC BLOOD PRESSURE: 80 MMHG | SYSTOLIC BLOOD PRESSURE: 121 MMHG

## 2022-11-02 DIAGNOSIS — I10 ESSENTIAL HYPERTENSION: Primary | ICD-10-CM

## 2022-11-02 PROCEDURE — 99213 OFFICE O/P EST LOW 20 MIN: CPT | Performed by: FAMILY MEDICINE

## 2022-11-02 PROCEDURE — 3078F DIAST BP <80 MM HG: CPT | Performed by: FAMILY MEDICINE

## 2022-11-02 PROCEDURE — 3074F SYST BP LT 130 MM HG: CPT | Performed by: FAMILY MEDICINE

## 2022-11-02 RX ORDER — CHLORTHALIDONE 25 MG/1
25 TABLET ORAL DAILY
Qty: 90 TABLET | Refills: 3 | Status: SHIPPED | OUTPATIENT
Start: 2022-11-02

## 2022-11-02 NOTE — PROGRESS NOTES
Chief Complaint   Patient presents with    Blood Pressure Check     Patient presents in office today for BP check. No concerns. 1. Have you been to the ER, urgent care clinic since your last visit? Hospitalized since your last visit? No    2. Have you seen or consulted any other health care providers outside of the [address] [place] [address] since your last visit? Include any pap smears or colon screening.  No    Learning Assessment 3/7/2019   PRIMARY LEARNER Patient   HIGHEST LEVEL OF EDUCATION - PRIMARY LEARNER  GRADUATED HIGH SCHOOL OR GED   BARRIERS PRIMARY LEARNER NONE   CO-LEARNER CAREGIVER No   PRIMARY LANGUAGE ENGLISH   LEARNER PREFERENCE PRIMARY LISTENING   ANSWERED BY self   RELATIONSHIP SELF

## 2022-11-02 NOTE — PROGRESS NOTES
Progress Note    he is a 65y.o. year old male who presents for evalution. Subjective:     Pt here for follow up on his blood pressure and he is now using cpap for his GAIL and is sleeping much better. He is now working on his weight. No s/e from medications. Down 14 lbs since sleep med visit in September. Reviewed PmHx, RxHx, FmHx, SocHx, AllgHx and updated and dated in the chart. Review of Systems - negative except as listed above in the HPI    Objective:     Vitals:    11/02/22 0938   BP: 121/80   Pulse: 65   Resp: 18   Temp: 98.1 °F (36.7 °C)   TempSrc: Oral   SpO2: 96%   Weight: 289 lb (131.1 kg)   Height: 6' 1\" (1.854 m)       Current Outpatient Medications   Medication Sig    chlorthalidone (HYGROTON) 25 mg tablet Take 1 Tablet by mouth daily. amLODIPine (NORVASC) 10 mg tablet Take 1 tablet by mouth once daily    tamsulosin (Flomax) 0.4 mg capsule Take 1 Cap by mouth daily. (Patient not taking: No sig reported)    aspirin delayed-release 81 mg tablet Take  by mouth daily. (Patient not taking: No sig reported)     No current facility-administered medications for this visit. Physical Examination: General appearance - alert, well appearing, and in no distress  Chest - clear to auscultation, no wheezes, rales or rhonchi, symmetric air entry  Heart - normal rate, regular rhythm, normal S1, S2, no murmurs, rubs, clicks or gallops      Assessment/ Plan:   Diagnoses and all orders for this visit:    1. Essential hypertension  -     METABOLIC PANEL, BASIC; Future  -     chlorthalidone (HYGROTON) 25 mg tablet; Take 1 Tablet by mouth daily. Follow-up and Dispositions    Return in about 6 months (around 5/2/2023), or if symptoms worsen or fail to improve, for schedule CPE. I have discussed the diagnosis with the patient and the intended plan as seen in the above orders. The patient has received an after-visit summary and questions were answered concerning future plans.  Pt conveyed understanding of plan.     Medication Side Effects and Warnings were discussed with patient    An electronic signature was used to authenticate this note  Mayela Rater, DO

## 2022-11-03 LAB
ANION GAP SERPL CALC-SCNC: 7 MMOL/L (ref 5–15)
BUN SERPL-MCNC: 17 MG/DL (ref 6–20)
BUN/CREAT SERPL: 14 (ref 12–20)
CALCIUM SERPL-MCNC: 9.2 MG/DL (ref 8.5–10.1)
CHLORIDE SERPL-SCNC: 103 MMOL/L (ref 97–108)
CO2 SERPL-SCNC: 28 MMOL/L (ref 21–32)
CREAT SERPL-MCNC: 1.19 MG/DL (ref 0.7–1.3)
GLUCOSE SERPL-MCNC: 94 MG/DL (ref 65–100)
POTASSIUM SERPL-SCNC: 3.2 MMOL/L (ref 3.5–5.1)
SODIUM SERPL-SCNC: 138 MMOL/L (ref 136–145)

## 2022-11-03 NOTE — PROGRESS NOTES
Your kidney function looks fine. However, your potassium is a little bit low. This is because of the blood pressure medication you take it causes you to urinate out more potassium. I would like you to increase your dietary potassium to help with this.

## 2022-11-16 NOTE — PROGRESS NOTES
[address] [facility] [place]. [place], [address]   Tel.  [phone]   Fax. 100 [facility] 60   [place], [address] [facility]   Tel.  [phone]   Fax. [phone] [address] [address]   Tel.  [phone]   Fax. 10 Tadeo Randall (: 1965) is a 65 y.o. male, established patient, seen for positive airway pressure follow-up and evaluation. He was last seen by Dr. Lis Manning on 2022, prior notes reviewed in detail. Home sleep test 3/2022 showed AHI of 72.3/hr with a lowest SaO2 of 49%. A subsequent titration study 2022 showed events responding to BiLevel therapy. He is seen today for follow up. ASSESSMENT/PLAN:    ICD-10-CM ICD-9-CM    1. GAIL (obstructive sleep apnea)  G47.33 327.23 AMB SUPPLY ORDER      2. Primary hypertension  I10 401.9       3. BMI 38.0-38.9,adult  Z68.38 V85.38         AHI = 72.3 (). On Bi - Level :  IPAP 22 cmH2O; EPAP 18 cmH2O. Set up . He is adherent with PAP therapy and PAP continues to benefit patient and remains necessary for control of his sleep apnea. Sleep Apnea - continue on current pressures. Orders Placed This Encounter    AMB SUPPLY ORDER     Diagnosis: (G47.33) GAIL (obstructive sleep apnea)  (primary encounter diagnosis)     Replacement Supplies for Positive Airway Pressure Therapy Device:   Duration of need: 99 months.  Full Face Mask 1 every 3 months.  Full Face Mask Cushion 1 per month.  Headgear 1 every 6 months.  Positive Airway Pressure chinstrap 1 every 6 months.  Tubing with heating element 1 every 3 months.  Filter(s) Disposable 2 per month.  Filter(s) Non-Disposable 1 every 6 months. .   [address] [facility] for Humidifier (Replace) 1 every 6 months. PEG VargasBC NPI: [id number]    Electronically signed.  Date:22     * Counseling was provided regarding the importance of regular PAP use with emphasis on ensuring sufficient total sleep time, proper sleep hygiene, and safe driving. * Re-enforced proper and regular cleaning for the device. * He was asked to contact our office for any problems regarding PAP therapy. 2. Hypertension -  continue on his current regimen, he will continue to monitor his BP and follow up with his PMD for reevaluation/adjustment of medications if warranted. I have reviewed the relationship between hypertension as it relates to sleep-disordered breathing. 3. Recommended a dedicated weight loss program through appropriate diet and exercise regimen as significant weight reduction has been shown to reduce severity of obstructive sleep apnea. SUBJECTIVE/OBJECTIVE:    He  is seen today for follow up on PAP device and reports no problems using the device. The following concerns identified:    Drowsiness no Problems exhaling no   Snoring no Forget to put on no   Mask Comfortable yes Can't fall asleep no   Dry Mouth no Mask falls off no   Air Leaking no Frequent awakenings no     He admits that his sleep has significantly improved on PAP therapy using full face mask and heated tubing. Review of device download indicated:  BiLevel pressure: IPAP 22 cmH2O; EPAP 18 cmH2O  % Used Days >= 4 hours: 86.7. Avg hours used:  5 hours 31 minutes. Therapy Apnea Index averaged over PAP use: 1.2 /hr which reflects significantly improved sleep breathing condition. [place] Sleepiness Score: 8 and Modified F.O.S.Q. Score Total / 2: 20 which reflects improved sleep quality over therapy time. Sleep Review of Systems: notable for Negative difficulty falling asleep; Negative awakenings at night; Negative early morning headaches; Negative memory problems; Negative concentration issues;  Negative chest pain; Negative shortness of breath; Negative significant joint pain at night; Negative significant muscle pain at night; Negative rashes or itching; Negative heartburn; Negative significant mood issues    Visit Vitals  /80 (BP 1 Location: Left upper arm, BP Patient Position: Sitting)   Pulse 68   Ht 6' 1\" (1.854 m)   Wt 289 lb 12.8 oz (131.5 kg)   SpO2 97%   BMI 38.23 kg/m²        General:   Alert, oriented, not in acute distress   Eyes:  Anicteric Sclerae; no obvious strabismus   Nose:  No obvious nasal septum deviation    Neck:   Midline trachea   Chest/Lungs:  Symmetrical lung expansion, clear lung fields on auscultation    CVS:  Normal rate, regular rhythm,  no JVD   Extremities:  No obvious rashes, no edema    Neuro:  No focal deficits; No obvious tremor    Psych:  Normal affect,  normal countenance     Patient's phone number [phone] (home) [phone] (work) was reviewed and confirmed for accuracy. He gives permission for messages regarding results and appointments to be left at that number. On this date 11/17/2022 I have spent 20 minutes reviewing previous notes, test results and face to face with the patient discussing the diagnosis and importance of compliance with the treatment plan as well as documenting on the day of the visit. An electronic signature was used to authenticate this note.     -- Jennifer Carpenter NP, [facility]  11/17/22

## 2022-11-17 ENCOUNTER — OFFICE VISIT (OUTPATIENT)
Dept: SLEEP MEDICINE | Age: 57
End: 2022-11-17
Payer: COMMERCIAL

## 2022-11-17 ENCOUNTER — DOCUMENTATION ONLY (OUTPATIENT)
Dept: SLEEP MEDICINE | Age: 57
End: 2022-11-17

## 2022-11-17 VITALS
HEART RATE: 68 BPM | OXYGEN SATURATION: 97 % | WEIGHT: 289.8 LBS | SYSTOLIC BLOOD PRESSURE: 121 MMHG | BODY MASS INDEX: 38.41 KG/M2 | DIASTOLIC BLOOD PRESSURE: 80 MMHG | HEIGHT: 73 IN

## 2022-11-17 DIAGNOSIS — I10 PRIMARY HYPERTENSION: ICD-10-CM

## 2022-11-17 DIAGNOSIS — G47.33 OSA (OBSTRUCTIVE SLEEP APNEA): Primary | ICD-10-CM

## 2022-11-17 PROCEDURE — 99213 OFFICE O/P EST LOW 20 MIN: CPT | Performed by: NURSE PRACTITIONER

## 2022-11-17 PROCEDURE — 3074F SYST BP LT 130 MM HG: CPT | Performed by: NURSE PRACTITIONER

## 2022-11-17 PROCEDURE — 3078F DIAST BP <80 MM HG: CPT | Performed by: NURSE PRACTITIONER

## 2022-11-17 NOTE — PATIENT INSTRUCTIONS
[address] [facility] [place]. [place], [address]  Tel.  [phone]  Fax. 100 [facility] 60  [place], [address] [facility] [address]  Tel.  [phone]  Fax. [phone] [address]  Tel.  [phone]  Fax. [phone]     Learning About CPAP for Sleep Apnea  What is CPAP? CPAP is a small machine that you use at home every night while you sleep. It increases air pressure in your throat to keep your airway open. When you have sleep apnea, this can help you sleep better so you feel much better. CPAP stands for \"continuous positive airway pressure. \"  The CPAP machine will have one of the following:  A mask that covers your nose and mouth  Prongs that fit into your nose  A mask that covers your nose only, the most common type. This type is called NCPAP. The N stands for \"nasal.\"  Why is it done? CPAP is usually the best treatment for obstructive sleep apnea. It is the first treatment choice and the most widely used. Your doctor may suggest CPAP if you have: Moderate to severe sleep apnea. Sleep apnea and coronary artery disease (CAD) or heart failure. How does it help? CPAP can help you have more normal sleep, so you feel less sleepy and more alert during the daytime. CPAP may help keep heart failure or other heart problems from getting worse. NCPAP may help lower your blood pressure. If you use CPAP, your bed partner may also sleep better because you are not snoring or restless. What are the side effects? Some people who use CPAP have:  A dry or stuffy nose and a sore throat. Irritated skin on the face. Sore eyes. Bloating. If you have any of these problems, work with your doctor to fix them. Here are some things you can try:  Be sure the mask or nasal prongs fit well. See if your doctor can adjust the pressure of your CPAP. If your nose is dry, try a humidifier.   If your nose is runny or stuffy, try decongestant medicine or a steroid nasal spray. If these things do not help, you might try a different type of machine. Some machines have air pressure that adjusts on its own. Others have air pressures that are different when you breathe in than when you breathe out. This may reduce discomfort caused by too much pressure in your nose. Where can you learn more? Go to [employer]  Enter Nga Amanuel in the search box to learn more about \"Learning About CPAP for Sleep Apnea. \"   © 0855-4403 Healthwise, Incorporated. Care instructions adapted under license by [address] [place] [address] [facility] (which disclaims liability or warranty for this information). This care instruction is for use with your licensed healthcare professional. If you have questions about a medical condition or this instruction, always ask your healthcare professional. [address] any warranty or liability for your use of this information. Content Version: 6.7.56625; Last Revised: January 11, 2010  PROPER SLEEP HYGIENE    What to avoid  Do not have drinks with caffeine, such as coffee or black tea, for 8 hours before bed. Do not smoke or use other types of tobacco near bedtime. Nicotine is a stimulant and can keep you awake. Avoid drinking alcohol late in the evening, because it can cause you to wake in the middle of the night. Do not eat a big meal close to bedtime. If you are hungry, eat a light snack. Do not drink a lot of water close to bedtime, because the need to urinate may wake you up during the night. Do not read or watch TV in bed. Use the bed only for sleeping and sexual activity. What to try  Go to bed at the same time every night, and wake up at the same time every morning. Do not take naps during the day. Keep your bedroom quiet, dark, and cool. Get regular exercise, but not within 3 to 4 hours of your bedtime. .  Sleep on a comfortable pillow and mattress.   If watching the clock makes you anxious, turn it facing away from you so you cannot see the time. If you worry when you lie down, start a worry book. Well before bedtime, write down your worries, and then set the book and your concerns aside. Try meditation or other relaxation techniques before you go to bed. If you cannot fall asleep, get up and go to another room until you feel sleepy. Do something relaxing. Repeat your bedtime routine before you go to bed again. Make your house quiet and calm about an hour before bedtime. Turn down the lights, turn off the TV, log off the computer, and turn down the volume on music. This can help you relax after a busy day. Drowsy Driving: The [employer] cites drowsiness as a causing factor in more than 094,552 police reported crashes annually, resulting in 76,000 injuries and 1,500 deaths. Other surveys suggest 55% of people polled have driven while drowsy in the past year, 23% had fallen asleep but not crashed, 3% crashed, and 2% had and accident due to drowsy driving. Who is at risk? Young Drivers: One study of drowsy driving accidents states that 55% of the drivers were under 25 years. Of those, 75% were male. Shift Workers and Travelers: People who work overnight or travel across time zones frequently are at higher risk of experiencing Circadian Rhythm Disorders. They are trying to work and function when their body is programed to sleep. Sleep Deprived: Lack of sleep has a serious impact on your ability to pay attention or focus on a task. Consistently getting less than the average of 8 hours your body needs creates partial or cumulative sleep deprivation. Untreated Sleep Disorders: Sleep Apnea, Narcolepsy, R.L.S., and other sleep disorders (untreated) prevent a person from getting enough restful sleep. This leads to excessive daytime sleepiness and increases the risk for drowsy driving accidents by up to 7 times.   Medications / Alcohol: Even over the counter medications can cause drowsiness. Medications that impair a drivers attention should have a warning label. Alcohol naturally makes you sleepy and on its own can cause accidents. Combined with excessive drowsiness its effects are amplified. Signs of Drowsy Driving:   * You don't remember driving the last few miles   * You may drift out of your chago   * You are unable to focus and your thoughts wander   * You may yawn more often than normal   * You have difficulty keeping your eyes open / nodding off   * Missing traffic signs, speeding, or tailgating  Prevention-   Good sleep hygiene, lifestyle and behavioral choices have the most impact on drowsy driving. There is no substitute for sleep and the average person requires 8 hours nightly. If you find yourself driving drowsy, stop and sleep. Consider the sleep hygiene tips provided during your visit as well. Medication Refill Policy: Refills for all medications require 1 week advance notice. Please have your pharmacy fax a refill request. We are unable to fax, or call in \"controled substance\" medications and you will need to pick these prescriptions up from our office. [address] [facility] Activation    Thank you for requesting access to [address] [facility]. Please follow the instructions below to securely access and download your online medical record. [address] [facility] allows you to send messages to your doctor, view your test results, renew your prescriptions, schedule appointments, and more. How Do I Sign Up? In your internet browser, go to [email or web address]. Click on the First Time User? Click Here link in the Sign In box. You will see the New Member Sign Up page. Enter your [address] [facility] [employer] [facility] [employer] Access Code exactly as it appears below. You will not need to use this code after youve completed the sign-up process. If you do not sign up before the expiration date, you must request a new code. [address] [facility] Access Code:  Activation code not generated  Current [address] [facility] [employer] Status: Active (This is the date your [employer] access code will )    Enter the last four digits of your Social Security Number (xxxx) and Date of Birth (mm/dd/yyyy) as indicated and click Submit. You will be taken to the next sign-up page. Create a [employer]Search ID. This will be your [employer] login ID and cannot be changed, so think of one that is secure and easy to remember. Create a [employer] password. You can change your password at any time. Enter your Password Reset Question and Answer. This can be used at a later time if you forget your password. Enter your e-mail address. You will receive e-mail notification when new information is available in [address] Click Sign Up. You can now view and download portions of your medical record. Click the ditlo link to download a portable copy of your medical information. Additional Information    If you have questions, please call [phone]. Remember, [employer]Search is NOT to be used for urgent needs. For medical emergencies, dial 911.

## 2023-04-24 ENCOUNTER — HOSPITAL ENCOUNTER (INPATIENT)
Age: 58
LOS: 3 days | Discharge: HOME | DRG: 390 | End: 2023-04-27
Attending: FAMILY MEDICINE | Admitting: SURGERY
Payer: COMMERCIAL

## 2023-04-24 ENCOUNTER — APPOINTMENT (OUTPATIENT)
Dept: CT IMAGING | Age: 58
DRG: 390 | End: 2023-04-24
Attending: FAMILY MEDICINE
Payer: COMMERCIAL

## 2023-04-24 DIAGNOSIS — K56.609 SMALL BOWEL OBSTRUCTION (HCC): Primary | ICD-10-CM

## 2023-04-24 LAB
ALBUMIN SERPL-MCNC: 4.3 G/DL (ref 3.5–5)
ALBUMIN/GLOB SERPL: 0.9 (ref 1.1–2.2)
ALP SERPL-CCNC: 47 U/L (ref 45–117)
ALT SERPL-CCNC: 31 U/L (ref 12–78)
ANION GAP SERPL CALC-SCNC: 3 MMOL/L (ref 5–15)
ANION GAP SERPL CALC-SCNC: 7 MMOL/L (ref 5–15)
AST SERPL W P-5'-P-CCNC: 27 U/L (ref 15–37)
BASOPHILS # BLD: 0.1 K/UL (ref 0–0.1)
BASOPHILS NFR BLD: 1 % (ref 0–1)
BILIRUB SERPL-MCNC: 0.9 MG/DL (ref 0.2–1)
BUN SERPL-MCNC: 17 MG/DL (ref 6–20)
BUN SERPL-MCNC: 24 MG/DL (ref 6–20)
BUN/CREAT SERPL: 14 (ref 12–20)
BUN/CREAT SERPL: 18 (ref 12–20)
CA-I BLD-MCNC: 8.8 MG/DL (ref 8.5–10.1)
CA-I BLD-MCNC: 9.1 MG/DL (ref 8.5–10.1)
CHLORIDE SERPL-SCNC: 101 MMOL/L (ref 97–108)
CHLORIDE SERPL-SCNC: 104 MMOL/L (ref 97–108)
CO2 SERPL-SCNC: 32 MMOL/L (ref 21–32)
CO2 SERPL-SCNC: 35 MMOL/L (ref 21–32)
CREAT SERPL-MCNC: 1.19 MG/DL (ref 0.7–1.3)
CREAT SERPL-MCNC: 1.37 MG/DL (ref 0.7–1.3)
DIFFERENTIAL METHOD BLD: ABNORMAL
EOSINOPHIL # BLD: 0.1 K/UL (ref 0–0.4)
EOSINOPHIL NFR BLD: 1 % (ref 0–7)
ERYTHROCYTE [DISTWIDTH] IN BLOOD BY AUTOMATED COUNT: 12.9 % (ref 11.5–14.5)
GLOBULIN SER CALC-MCNC: 4.6 G/DL (ref 2–4)
GLUCOSE SERPL-MCNC: 104 MG/DL (ref 65–100)
GLUCOSE SERPL-MCNC: 125 MG/DL (ref 65–100)
HCT VFR BLD AUTO: 41.4 % (ref 36.6–50.3)
HGB BLD-MCNC: 13.7 G/DL (ref 12.1–17)
IMM GRANULOCYTES # BLD AUTO: 0 K/UL (ref 0–0.04)
IMM GRANULOCYTES NFR BLD AUTO: 0 % (ref 0–0.5)
LIPASE SERPL-CCNC: 390 U/L (ref 73–393)
LYMPHOCYTES # BLD: 1.1 K/UL (ref 0.8–3.5)
LYMPHOCYTES NFR BLD: 11 % (ref 12–49)
MCH RBC QN AUTO: 29.7 PG (ref 26–34)
MCHC RBC AUTO-ENTMCNC: 33.1 G/DL (ref 30–36.5)
MCV RBC AUTO: 89.6 FL (ref 80–99)
MONOCYTES # BLD: 0.4 K/UL (ref 0–1)
MONOCYTES NFR BLD: 4 % (ref 5–13)
NEUTS SEG # BLD: 8.8 K/UL (ref 1.8–8)
NEUTS SEG NFR BLD: 83 % (ref 32–75)
PLATELET # BLD AUTO: 269 K/UL (ref 150–400)
PMV BLD AUTO: 9.3 FL (ref 8.9–12.9)
POTASSIUM SERPL-SCNC: 2.7 MMOL/L (ref 3.5–5.1)
POTASSIUM SERPL-SCNC: 3.2 MMOL/L (ref 3.5–5.1)
PROT SERPL-MCNC: 8.9 G/DL (ref 6.4–8.2)
RBC # BLD AUTO: 4.62 M/UL (ref 4.1–5.7)
SODIUM SERPL-SCNC: 139 MMOL/L (ref 136–145)
SODIUM SERPL-SCNC: 143 MMOL/L (ref 136–145)
WBC # BLD AUTO: 10.4 K/UL (ref 4.1–11.1)

## 2023-04-24 PROCEDURE — 80053 COMPREHEN METABOLIC PANEL: CPT

## 2023-04-24 PROCEDURE — 99222 1ST HOSP IP/OBS MODERATE 55: CPT | Performed by: SURGERY

## 2023-04-24 PROCEDURE — 83690 ASSAY OF LIPASE: CPT

## 2023-04-24 PROCEDURE — 99285 EMERGENCY DEPT VISIT HI MDM: CPT

## 2023-04-24 PROCEDURE — 74011000636 HC RX REV CODE- 636: Performed by: FAMILY MEDICINE

## 2023-04-24 PROCEDURE — 74177 CT ABD & PELVIS W/CONTRAST: CPT

## 2023-04-24 PROCEDURE — 80048 BASIC METABOLIC PNL TOTAL CA: CPT

## 2023-04-24 PROCEDURE — 74011250636 HC RX REV CODE- 250/636: Performed by: SURGERY

## 2023-04-24 PROCEDURE — 65270000029 HC RM PRIVATE

## 2023-04-24 PROCEDURE — 74011250637 HC RX REV CODE- 250/637: Performed by: FAMILY MEDICINE

## 2023-04-24 PROCEDURE — 74011000250 HC RX REV CODE- 250: Performed by: SURGERY

## 2023-04-24 PROCEDURE — 96374 THER/PROPH/DIAG INJ IV PUSH: CPT

## 2023-04-24 PROCEDURE — 74011250636 HC RX REV CODE- 250/636: Performed by: FAMILY MEDICINE

## 2023-04-24 PROCEDURE — 36415 COLL VENOUS BLD VENIPUNCTURE: CPT

## 2023-04-24 PROCEDURE — 85025 COMPLETE CBC W/AUTO DIFF WBC: CPT

## 2023-04-24 RX ORDER — SODIUM CHLORIDE 0.9 % (FLUSH) 0.9 %
5-40 SYRINGE (ML) INJECTION EVERY 8 HOURS
Status: DISCONTINUED | OUTPATIENT
Start: 2023-04-24 | End: 2023-04-25

## 2023-04-24 RX ORDER — ONDANSETRON 4 MG/1
4 TABLET, ORALLY DISINTEGRATING ORAL
Status: DISCONTINUED | OUTPATIENT
Start: 2023-04-24 | End: 2023-04-27 | Stop reason: HOSPADM

## 2023-04-24 RX ORDER — ACETAMINOPHEN 325 MG/1
650 TABLET ORAL
Status: DISCONTINUED | OUTPATIENT
Start: 2023-04-24 | End: 2023-04-27 | Stop reason: HOSPADM

## 2023-04-24 RX ORDER — MAGNESIUM SULFATE HEPTAHYDRATE 40 MG/ML
2 INJECTION, SOLUTION INTRAVENOUS ONCE
Status: COMPLETED | OUTPATIENT
Start: 2023-04-24 | End: 2023-04-24

## 2023-04-24 RX ORDER — POTASSIUM CHLORIDE 750 MG/1
40 TABLET, FILM COATED, EXTENDED RELEASE ORAL
Status: COMPLETED | OUTPATIENT
Start: 2023-04-24 | End: 2023-04-24

## 2023-04-24 RX ORDER — SODIUM CHLORIDE 0.9 % (FLUSH) 0.9 %
5-40 SYRINGE (ML) INJECTION AS NEEDED
Status: DISCONTINUED | OUTPATIENT
Start: 2023-04-24 | End: 2023-04-25

## 2023-04-24 RX ORDER — SODIUM CHLORIDE 9 MG/ML
125 INJECTION, SOLUTION INTRAVENOUS CONTINUOUS
Status: DISCONTINUED | OUTPATIENT
Start: 2023-04-24 | End: 2023-04-27 | Stop reason: HOSPADM

## 2023-04-24 RX ORDER — ACETAMINOPHEN 650 MG/1
650 SUPPOSITORY RECTAL
Status: DISCONTINUED | OUTPATIENT
Start: 2023-04-24 | End: 2023-04-27 | Stop reason: HOSPADM

## 2023-04-24 RX ORDER — HYDROMORPHONE HYDROCHLORIDE 1 MG/ML
1 INJECTION, SOLUTION INTRAMUSCULAR; INTRAVENOUS; SUBCUTANEOUS
Status: ACTIVE | OUTPATIENT
Start: 2023-04-24 | End: 2023-04-26

## 2023-04-24 RX ORDER — ONDANSETRON 2 MG/ML
4 INJECTION INTRAMUSCULAR; INTRAVENOUS
Status: COMPLETED | OUTPATIENT
Start: 2023-04-24 | End: 2023-04-24

## 2023-04-24 RX ORDER — ONDANSETRON 2 MG/ML
4 INJECTION INTRAMUSCULAR; INTRAVENOUS
Status: DISCONTINUED | OUTPATIENT
Start: 2023-04-24 | End: 2023-04-27 | Stop reason: HOSPADM

## 2023-04-24 RX ORDER — POTASSIUM CHLORIDE 7.45 MG/ML
10 INJECTION INTRAVENOUS
Status: COMPLETED | OUTPATIENT
Start: 2023-04-24 | End: 2023-04-25

## 2023-04-24 RX ORDER — ENOXAPARIN SODIUM 100 MG/ML
40 INJECTION SUBCUTANEOUS DAILY
Status: DISCONTINUED | OUTPATIENT
Start: 2023-04-25 | End: 2023-04-27 | Stop reason: HOSPADM

## 2023-04-24 RX ADMIN — SODIUM CHLORIDE, PRESERVATIVE FREE 10 ML: 5 INJECTION INTRAVENOUS at 22:07

## 2023-04-24 RX ADMIN — POTASSIUM CHLORIDE 10 MEQ: 7.45 INJECTION INTRAVENOUS at 23:17

## 2023-04-24 RX ADMIN — POTASSIUM CHLORIDE 40 MEQ: 750 TABLET, EXTENDED RELEASE ORAL at 10:38

## 2023-04-24 RX ADMIN — SODIUM CHLORIDE 125 ML/HR: 9 INJECTION, SOLUTION INTRAVENOUS at 14:25

## 2023-04-24 RX ADMIN — MAGNESIUM SULFATE HEPTAHYDRATE 2 G: 2 INJECTION, SOLUTION INTRAVENOUS at 21:58

## 2023-04-24 RX ADMIN — IOPAMIDOL 100 ML: 755 INJECTION, SOLUTION INTRAVENOUS at 09:54

## 2023-04-24 RX ADMIN — SODIUM CHLORIDE 1000 ML: 9 INJECTION, SOLUTION INTRAVENOUS at 10:39

## 2023-04-24 RX ADMIN — ONDANSETRON 4 MG: 2 INJECTION INTRAMUSCULAR; INTRAVENOUS at 09:08

## 2023-04-24 RX ADMIN — POTASSIUM CHLORIDE 10 MEQ: 7.45 INJECTION INTRAVENOUS at 21:58
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
## 2023-04-25 ENCOUNTER — APPOINTMENT (OUTPATIENT)
Dept: CT IMAGING | Age: 58
DRG: 390 | End: 2023-04-25
Attending: SURGERY
Payer: COMMERCIAL

## 2023-04-25 ENCOUNTER — TELEPHONE (OUTPATIENT)
Dept: SURGERY | Age: 58
End: 2023-04-25

## 2023-04-25 LAB
ALBUMIN SERPL-MCNC: 3.5 G/DL (ref 3.5–5)
ALBUMIN/GLOB SERPL: 0.9 (ref 1.1–2.2)
ALP SERPL-CCNC: 40 U/L (ref 45–117)
ALT SERPL-CCNC: 24 U/L (ref 12–78)
ANION GAP SERPL CALC-SCNC: 2 MMOL/L (ref 5–15)
AST SERPL W P-5'-P-CCNC: 10 U/L (ref 15–37)
BASOPHILS # BLD: 0 K/UL (ref 0–0.1)
BASOPHILS NFR BLD: 0 % (ref 0–1)
BILIRUB SERPL-MCNC: 1.2 MG/DL (ref 0.2–1)
BUN SERPL-MCNC: 17 MG/DL (ref 6–20)
BUN/CREAT SERPL: 15 (ref 12–20)
CA-I BLD-MCNC: 8 MG/DL (ref 8.5–10.1)
CHLORIDE SERPL-SCNC: 106 MMOL/L (ref 97–108)
CO2 SERPL-SCNC: 30 MMOL/L (ref 21–32)
CREAT SERPL-MCNC: 1.15 MG/DL (ref 0.7–1.3)
DIFFERENTIAL METHOD BLD: ABNORMAL
EOSINOPHIL # BLD: 0.1 K/UL (ref 0–0.4)
EOSINOPHIL NFR BLD: 1 % (ref 0–7)
ERYTHROCYTE [DISTWIDTH] IN BLOOD BY AUTOMATED COUNT: 13 % (ref 11.5–14.5)
GLOBULIN SER CALC-MCNC: 4.1 G/DL (ref 2–4)
GLUCOSE SERPL-MCNC: 119 MG/DL (ref 65–100)
HCT VFR BLD AUTO: 39.8 % (ref 36.6–50.3)
HGB BLD-MCNC: 13.2 G/DL (ref 12.1–17)
IMM GRANULOCYTES # BLD AUTO: 0.1 K/UL (ref 0–0.04)
IMM GRANULOCYTES NFR BLD AUTO: 1 % (ref 0–0.5)
LYMPHOCYTES # BLD: 1.7 K/UL (ref 0.8–3.5)
LYMPHOCYTES NFR BLD: 12 % (ref 12–49)
MCH RBC QN AUTO: 29.5 PG (ref 26–34)
MCHC RBC AUTO-ENTMCNC: 33.2 G/DL (ref 30–36.5)
MCV RBC AUTO: 89 FL (ref 80–99)
MONOCYTES # BLD: 0.9 K/UL (ref 0–1)
MONOCYTES NFR BLD: 7 % (ref 5–13)
NEUTS SEG # BLD: 10.9 K/UL (ref 1.8–8)
NEUTS SEG NFR BLD: 79 % (ref 32–75)
NRBC # BLD: 0 K/UL (ref 0–0.01)
NRBC BLD-RTO: 0 PER 100 WBC
PLATELET # BLD AUTO: 277 K/UL (ref 150–400)
PMV BLD AUTO: 9.4 FL (ref 8.9–12.9)
POTASSIUM SERPL-SCNC: 3 MMOL/L (ref 3.5–5.1)
PROT SERPL-MCNC: 7.6 G/DL (ref 6.4–8.2)
RBC # BLD AUTO: 4.47 M/UL (ref 4.1–5.7)
SODIUM SERPL-SCNC: 138 MMOL/L (ref 136–145)
WBC # BLD AUTO: 13.6 K/UL (ref 4.1–11.1)

## 2023-04-25 PROCEDURE — 94640 AIRWAY INHALATION TREATMENT: CPT

## 2023-04-25 PROCEDURE — 74011250636 HC RX REV CODE- 250/636: Performed by: SURGERY

## 2023-04-25 PROCEDURE — 94761 N-INVAS EAR/PLS OXIMETRY MLT: CPT

## 2023-04-25 PROCEDURE — 85025 COMPLETE CBC W/AUTO DIFF WBC: CPT

## 2023-04-25 PROCEDURE — 77010033678 HC OXYGEN DAILY

## 2023-04-25 PROCEDURE — 36415 COLL VENOUS BLD VENIPUNCTURE: CPT

## 2023-04-25 PROCEDURE — 74176 CT ABD & PELVIS W/O CONTRAST: CPT

## 2023-04-25 PROCEDURE — 74011000250 HC RX REV CODE- 250: Performed by: SURGERY

## 2023-04-25 PROCEDURE — 65270000029 HC RM PRIVATE

## 2023-04-25 PROCEDURE — 80053 COMPREHEN METABOLIC PANEL: CPT

## 2023-04-25 PROCEDURE — 99232 SBSQ HOSP IP/OBS MODERATE 35: CPT | Performed by: SURGERY

## 2023-04-25 RX ORDER — POTASSIUM CHLORIDE 7.45 MG/ML
10 INJECTION INTRAVENOUS
Status: COMPLETED | OUTPATIENT
Start: 2023-04-25 | End: 2023-04-25

## 2023-04-25 RX ORDER — IPRATROPIUM BROMIDE AND ALBUTEROL SULFATE 2.5; .5 MG/3ML; MG/3ML
3 SOLUTION RESPIRATORY (INHALATION)
Status: DISCONTINUED | OUTPATIENT
Start: 2023-04-25 | End: 2023-04-25

## 2023-04-25 RX ORDER — IPRATROPIUM BROMIDE AND ALBUTEROL SULFATE 2.5; .5 MG/3ML; MG/3ML
3 SOLUTION RESPIRATORY (INHALATION)
Status: DISCONTINUED | OUTPATIENT
Start: 2023-04-25 | End: 2023-04-27 | Stop reason: HOSPADM

## 2023-04-25 RX ADMIN — POTASSIUM CHLORIDE 10 MEQ: 7.45 INJECTION INTRAVENOUS at 01:25

## 2023-04-25 RX ADMIN — POTASSIUM CHLORIDE 10 MEQ: 7.45 INJECTION INTRAVENOUS at 04:23

## 2023-04-25 RX ADMIN — POTASSIUM CHLORIDE 10 MEQ: 7.45 INJECTION INTRAVENOUS at 05:25

## 2023-04-25 RX ADMIN — POTASSIUM CHLORIDE 10 MEQ: 7.45 INJECTION INTRAVENOUS at 03:29

## 2023-04-25 RX ADMIN — SODIUM CHLORIDE, PRESERVATIVE FREE 10 ML: 5 INJECTION INTRAVENOUS at 05:05

## 2023-04-25 RX ADMIN — SODIUM CHLORIDE 125 ML/HR: 9 INJECTION, SOLUTION INTRAVENOUS at 20:01

## 2023-04-25 RX ADMIN — POTASSIUM CHLORIDE 10 MEQ: 7.45 INJECTION INTRAVENOUS at 02:26

## 2023-04-25 RX ADMIN — POTASSIUM CHLORIDE 10 MEQ: 7.46 INJECTION, SOLUTION INTRAVENOUS at 15:46

## 2023-04-25 RX ADMIN — ENOXAPARIN SODIUM 40 MG: 100 INJECTION SUBCUTANEOUS at 10:02

## 2023-04-25 RX ADMIN — IPRATROPIUM BROMIDE AND ALBUTEROL SULFATE 3 ML: 2.5; .5 SOLUTION RESPIRATORY (INHALATION) at 08:37

## 2023-04-25 RX ADMIN — SODIUM CHLORIDE 125 ML/HR: 9 INJECTION, SOLUTION INTRAVENOUS at 04:25

## 2023-04-25 RX ADMIN — POTASSIUM CHLORIDE 10 MEQ: 7.45 INJECTION INTRAVENOUS at 00:20

## 2023-04-25 RX ADMIN — POTASSIUM CHLORIDE 10 MEQ: 7.46 INJECTION, SOLUTION INTRAVENOUS at 14:55

## 2023-04-25 RX ADMIN — POTASSIUM CHLORIDE 10 MEQ: 7.46 INJECTION, SOLUTION INTRAVENOUS at 13:50

## 2023-04-25 NOTE — PROGRESS NOTES
Reason for Admission:  SBO                     RUR Score: 6%                    Plan for utilizing home health:  None @ this time/uses no DME/self care/6 steps outside. PCP: First and Last name:  June Livingston DO     Name of Practice:    Are you a current patient: Yes/No: Yes   Approximate date of last visit: Seen 3 mos ago. Can you participate in a virtual visit with your PCP: Yes/Call/Has cell phone. Current Advanced Directive/Advance Care Plan: Full Code      Healthcare Decision Maker:              Primary Decision Maker: Viviana Chow - Spouse - [phone]                  Transition of Care Plan:                    D/C Plan is home with wife & wife to transport. Send Rxs to [facility] in [place] upon discharge.

## 2023-04-25 NOTE — PROGRESS NOTES
Problem: Falls - Risk of  Goal: *Absence of Falls  Description: Document Tory Romero Fall Risk and appropriate interventions in the flowsheet.   Outcome: Progressing Towards Goal  Note: Fall Risk Interventions:                                Problem: Patient Education: Go to Patient Education Activity  Goal: Patient/Family Education  Outcome: Progressing Towards Goal     Problem: Pain  Goal: *Control of Pain  Outcome: Progressing Towards Goal

## 2023-04-25 NOTE — PROGRESS NOTES
PROGRESS NOTE      Chief Complaints:  Patient is examined this morning  HPI and  Objective:    Patient states pain is better however nausea functional.  Denies of passing gas no fever. Review of Systems:  Rest of review of system reviewed personally and they are negative. EXAM:  Visit Vitals  /87 (BP 1 Location: Left upper arm, BP Patient Position: At rest;Semi fowlers)   Pulse 82   Temp 98.2 °F (36.8 °C)   Resp 18   Ht 6' 1\" (1.854 m)   Wt 275 lb (124.7 kg)   SpO2 97%   BMI 36.28 kg/m²       Patient is awake   Head and neck atraumatic, normocephalic. ENT: No hoarse voice, gaze appropriate. Cardiac system regular rate rhythm. Pulmonary no audible wheeze, no cyanosis. Chest wall excursion normal with respiration cycle  Abdomen is soft not particularly distended. Neurologically nonfocal.  Hematology system: No bruising noted. Musculoskeletal system: No joint deformity noted. Genitourinary system: No hematuria noted. Skin is warm and moist.  Psychosocial: Cooperative. Vascular examination as previously noted no changes.     Current Facility-Administered Medications   Medication Dose Route Frequency Provider Last Rate Last Admin    albuterol-ipratropium (DUO-NEB) 2.5 MG-0.5 MG/3 ML  3 mL Nebulization Q6H PRN Negrita Mir MD        sodium chloride (NS) flush 5-40 mL  5-40 mL IntraVENous Q8H Negrita Mir MD   10 mL at 04/25/23 0505    sodium chloride (NS) flush 5-40 mL  5-40 mL IntraVENous PRN Negrita Mir MD        acetaminophen (TYLENOL) tablet 650 mg  650 mg Oral Q6H PRN Negrita Mir MD        Or    acetaminophen (TYLENOL) suppository 650 mg  650 mg Rectal Q6H PRN Negrita Mir MD        ondansetron [facility] ODT) tablet 4 mg  4 mg Oral Q8H PRN Negrita Mir MD        Or    ondansetron [facility]) injection 4 mg  4 mg IntraVENous Q6H PRN Negrita Mir MD        enoxaparin (LOVENOX) injection 40 mg  40 mg SubCUTAneous DAILY Negrita Mir MD   40 mg at 04/25/23 1002    0.9% sodium chloride infusion  125 mL/hr IntraVENous CONTINUOUS Negrita Mir  mL/hr at 04/25/23 0425 125 mL/hr at 04/25/23 0425    HYDROmorphone (DILAUDID) injection 1 mg  1 mg IntraVENous Q4H PRN Negrita Mir MD        benzocaine (HURRICANE) 20 % spray   Mucous Membrane PRN Negrita Mir MD               Recent Results (from the past 24 hour(s))   METABOLIC PANEL, BASIC    Collection Time: 04/24/23  8:35 PM   Result Value Ref Range    Sodium 139 136 - 145 mmol/L    Potassium 2.7 (LL) 3.5 - 5.1 mmol/L    Chloride 104 97 - 108 mmol/L    CO2 32 21 - 32 mmol/L    Anion gap 3 (L) 5 - 15 mmol/L    Glucose 104 (H) 65 - 100 mg/dL    BUN 17 6 - 20 mg/dL    Creatinine 1.19 0.70 - 1.30 mg/dL    BUN/Creatinine ratio 14 12 - 20      eGFR >60 >60 ml/min/1.73m2    Calcium 8.8 8.5 - 10.1 mg/dL   CBC WITH AUTOMATED DIFF    Collection Time: 04/25/23  6:09 AM   Result Value Ref Range    WBC 13.6 (H) 4.1 - 11.1 K/uL    RBC 4.47 4.10 - 5.70 M/uL    HGB 13.2 12.1 - 17.0 g/dL    HCT 39.8 36.6 - 50.3 %    MCV 89.0 80.0 - 99.0 FL    MCH 29.5 26.0 - 34.0 PG    MCHC 33.2 30.0 - 36.5 g/dL    RDW 13.0 11.5 - 14.5 %    PLATELET 611 699 - 619 K/uL    MPV 9.4 8.9 - 12.9 FL    NRBC 0.0 0.0  WBC    ABSOLUTE NRBC 0.00 0.00 - 0.01 K/uL    NEUTROPHILS 79 (H) 32 - 75 %    LYMPHOCYTES 12 12 - 49 %    MONOCYTES 7 5 - 13 %    EOSINOPHILS 1 0 - 7 %    BASOPHILS 0 0 - 1 %    IMMATURE GRANULOCYTES 1 (H) 0 - 0.5 %    ABS. NEUTROPHILS 10.9 (H) 1.8 - 8.0 K/UL    ABS. LYMPHOCYTES 1.7 0.8 - 3.5 K/UL    ABS. MONOCYTES 0.9 0.0 - 1.0 K/UL    ABS. EOSINOPHILS 0.1 0.0 - 0.4 K/UL    ABS. BASOPHILS 0.0 0.0 - 0.1 K/UL    ABS. IMM.  GRANS. 0.1 (H) 0.00 - 0.04 K/UL    DF AUTOMATED     METABOLIC PANEL, COMPREHENSIVE    Collection Time: 04/25/23  6:09 AM   Result Value Ref Range    Sodium 138 136 - 145 mmol/L    Potassium 3.0 (L) 3.5 - 5.1 mmol/L    Chloride 106 97 - 108 mmol/L    CO2 30 21 - 32 mmol/L    Anion gap 2 (L) 5 - 15 mmol/L    Glucose 119 (H) 65 - 100 mg/dL    BUN 17 6 - 20 mg/dL    Creatinine 1.15 0.70 - 1.30 mg/dL    BUN/Creatinine ratio 15 12 - 20      eGFR >60 >60 ml/min/1.73m2    Calcium 8.0 (L) 8.5 - 10.1 mg/dL    Bilirubin, total 1.2 (H) 0.2 - 1.0 mg/dL    AST (SGOT) 10 (L) 15 - 37 U/L    ALT (SGPT) 24 12 - 78 U/L    Alk. phosphatase 40 (L) 45 - 117 U/L    Protein, total 7.6 6.4 - 8.2 g/dL    Albumin 3.5 3.5 - 5.0 g/dL    Globulin 4.1 (H) 2.0 - 4.0 g/dL    A-G Ratio 0.9 (L) 1.1 - 2.2         ASSESSMENT:   Patient is 65 y.o. with diagnosis of : Active Problems:    SBO (small bowel obstruction) ([facility] [address] (4/24/2023)      Bowel obstruction ([facility] [address] (4/24/2023)        PLAN:                     We will get CT scan of the abdomen pelvis today. Potassium 3.0. We will replace potassium deficits. Continue n.p.o. NG tube suctioning. Continue IV fluid  I will update patient's family after CT scan reviewed.

## 2023-04-25 NOTE — PROGRESS NOTES
Patient wife/daughter concerned about his potassium level and not having a heart monitor and asked me to call the doctor and get a telemetry monitor. Dr. Elizabeth Torres stated that was okay and gave an order for telemetry. Nursing supervisor called and is getting that ready. Patient's family member then concerned about his IVF and had me talk to Dr. Elizabeth Torres about it. He stated that the rate wasn't too much and he would explain that to her in the morning. 80-Patient family member concerned about his NGT drainage is bloody, I stated it is not bloody, it is just brown, and he had already put out 900ml of brown drainage. She was adamant it should be green bile, and tried to explain he has a bowel obstruction, so it would most likely be brown instead of green or clear. 2345-placed patient on the heart monitor when it arrived, she was upset that it took so long to get, explained that the supervisor has to set it up and then the centralized telemetry monitoring people get it to us. Then she stated she was going to go to her car and get her stethoscope and listen to his bowel sounds all night long. Tried to tell her that with an obstruction they can be hypoactive for awhile. She then wanted to look at the telemetry box and read the rhythm for herself. 0050-Daughter stating he wears a CPAP at home and he needs oxygen and respiratory therapy to come see him. Checked his oxygen levels and they are 94%. Called [facility] and they are going to come assess him as well.

## 2023-04-25 NOTE — PROGRESS NOTES
Maximus Villa Cachorro through messaging service regarding wife's concerns over plan of care, potassium level, and questioning why duo-nebs are ordered    Provided with wife's name and phone number

## 2023-04-25 NOTE — TELEPHONE ENCOUNTER
Spoke to Madelyn Yao and let her know they would have to talk to a nurse at the hospital to have dr Clarke elliott. He is in surgeries and at the hospital for the next two days. She stated she will speak to the nurses there.

## 2023-04-25 NOTE — TELEPHONE ENCOUNTER
Yesenia, with [facility] would like to have patients wife called as soon as possible. She would like to know the plan of care for the patient while in the hospital. She states that the patient's wife is a nurse. I advised Anton Rodriguez that Dr. Chica Lopez is not on call, London Velasquez is but she specifically wants to speak with Dr Chica Lopez per Anton Rodriguez.  Phone for wife: [phone]
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
## 2023-04-26 LAB
ALBUMIN SERPL-MCNC: 3.6 G/DL (ref 3.5–5)
ALBUMIN/GLOB SERPL: 0.8 (ref 1.1–2.2)
ALP SERPL-CCNC: 40 U/L (ref 45–117)
ALT SERPL-CCNC: 21 U/L (ref 12–78)
ANION GAP SERPL CALC-SCNC: 5 MMOL/L (ref 5–15)
AST SERPL W P-5'-P-CCNC: 11 U/L (ref 15–37)
BASOPHILS # BLD: 0.1 K/UL (ref 0–0.1)
BASOPHILS NFR BLD: 1 % (ref 0–1)
BILIRUB SERPL-MCNC: 1.2 MG/DL (ref 0.2–1)
BUN SERPL-MCNC: 13 MG/DL (ref 6–20)
BUN/CREAT SERPL: 12 (ref 12–20)
CA-I BLD-MCNC: 8.3 MG/DL (ref 8.5–10.1)
CHLORIDE SERPL-SCNC: 104 MMOL/L (ref 97–108)
CO2 SERPL-SCNC: 28 MMOL/L (ref 21–32)
CREAT SERPL-MCNC: 1.06 MG/DL (ref 0.7–1.3)
DIFFERENTIAL METHOD BLD: ABNORMAL
EOSINOPHIL # BLD: 0.1 K/UL (ref 0–0.4)
EOSINOPHIL NFR BLD: 1 % (ref 0–7)
ERYTHROCYTE [DISTWIDTH] IN BLOOD BY AUTOMATED COUNT: 13 % (ref 11.5–14.5)
GLOBULIN SER CALC-MCNC: 4.5 G/DL (ref 2–4)
GLUCOSE SERPL-MCNC: 113 MG/DL (ref 65–100)
HCT VFR BLD AUTO: 41.5 % (ref 36.6–50.3)
HGB BLD-MCNC: 13.8 G/DL (ref 12.1–17)
IMM GRANULOCYTES # BLD AUTO: 0 K/UL (ref 0–0.04)
IMM GRANULOCYTES NFR BLD AUTO: 0 % (ref 0–0.5)
LYMPHOCYTES # BLD: 2.1 K/UL (ref 0.8–3.5)
LYMPHOCYTES NFR BLD: 17 % (ref 12–49)
MCH RBC QN AUTO: 29.2 PG (ref 26–34)
MCHC RBC AUTO-ENTMCNC: 33.3 G/DL (ref 30–36.5)
MCV RBC AUTO: 87.7 FL (ref 80–99)
MONOCYTES # BLD: 1 K/UL (ref 0–1)
MONOCYTES NFR BLD: 8 % (ref 5–13)
NEUTS SEG # BLD: 9.2 K/UL (ref 1.8–8)
NEUTS SEG NFR BLD: 73 % (ref 32–75)
NRBC # BLD: 0 K/UL (ref 0–0.01)
NRBC BLD-RTO: 0 PER 100 WBC
PLATELET # BLD AUTO: 287 K/UL (ref 150–400)
PMV BLD AUTO: 9.5 FL (ref 8.9–12.9)
POTASSIUM SERPL-SCNC: 2.9 MMOL/L (ref 3.5–5.1)
PROT SERPL-MCNC: 8.1 G/DL (ref 6.4–8.2)
RBC # BLD AUTO: 4.73 M/UL (ref 4.1–5.7)
SODIUM SERPL-SCNC: 137 MMOL/L (ref 136–145)
WBC # BLD AUTO: 12.5 K/UL (ref 4.1–11.1)

## 2023-04-26 PROCEDURE — 74011250636 HC RX REV CODE- 250/636: Performed by: SURGERY

## 2023-04-26 PROCEDURE — 36415 COLL VENOUS BLD VENIPUNCTURE: CPT

## 2023-04-26 PROCEDURE — 80053 COMPREHEN METABOLIC PANEL: CPT

## 2023-04-26 PROCEDURE — 65270000029 HC RM PRIVATE

## 2023-04-26 PROCEDURE — 85025 COMPLETE CBC W/AUTO DIFF WBC: CPT

## 2023-04-26 PROCEDURE — 74011250637 HC RX REV CODE- 250/637: Performed by: SURGERY

## 2023-04-26 RX ORDER — POTASSIUM CHLORIDE 7.45 MG/ML
10 INJECTION INTRAVENOUS
Status: COMPLETED | OUTPATIENT
Start: 2023-04-26 | End: 2023-04-26

## 2023-04-26 RX ORDER — POTASSIUM CHLORIDE 750 MG/1
40 TABLET, FILM COATED, EXTENDED RELEASE ORAL
Status: COMPLETED | OUTPATIENT
Start: 2023-04-26 | End: 2023-04-26

## 2023-04-26 RX ADMIN — POTASSIUM CHLORIDE 10 MEQ: 7.46 INJECTION, SOLUTION INTRAVENOUS at 17:55

## 2023-04-26 RX ADMIN — POTASSIUM CHLORIDE 10 MEQ: 7.46 INJECTION, SOLUTION INTRAVENOUS at 14:05

## 2023-04-26 RX ADMIN — POTASSIUM CHLORIDE 10 MEQ: 7.46 INJECTION, SOLUTION INTRAVENOUS at 15:24

## 2023-04-26 RX ADMIN — POTASSIUM CHLORIDE 40 MEQ: 750 TABLET, EXTENDED RELEASE ORAL at 14:05

## 2023-04-26 RX ADMIN — POTASSIUM CHLORIDE 10 MEQ: 7.46 INJECTION, SOLUTION INTRAVENOUS at 16:23

## 2023-04-26 NOTE — PROGRESS NOTES
CM reviewed chart. NG tube removed, started on full liquid diet. Current discharge plan is home self care at this time.

## 2023-04-26 NOTE — PROGRESS NOTES
Problem: Falls - Risk of  Goal: *Absence of Falls  Description: Document Brandyn Willson Fall Risk and appropriate interventions in the flowsheet.   Outcome: Progressing Towards Goal  Note: Fall Risk Interventions:                                Problem: Pain  Goal: *Control of Pain  Outcome: Progressing Towards Goal

## 2023-04-27 VITALS
TEMPERATURE: 98.2 F | WEIGHT: 275 LBS | OXYGEN SATURATION: 100 % | BODY MASS INDEX: 36.45 KG/M2 | HEIGHT: 73 IN | RESPIRATION RATE: 18 BRPM | HEART RATE: 68 BPM | SYSTOLIC BLOOD PRESSURE: 137 MMHG | DIASTOLIC BLOOD PRESSURE: 82 MMHG

## 2023-04-27 LAB
ANION GAP SERPL CALC-SCNC: 4 MMOL/L (ref 5–15)
BASOPHILS # BLD: 0.1 K/UL (ref 0–0.1)
BASOPHILS NFR BLD: 1 % (ref 0–1)
BUN SERPL-MCNC: 13 MG/DL (ref 6–20)
BUN/CREAT SERPL: 13 (ref 12–20)
CA-I BLD-MCNC: 8.1 MG/DL (ref 8.5–10.1)
CHLORIDE SERPL-SCNC: 108 MMOL/L (ref 97–108)
CO2 SERPL-SCNC: 27 MMOL/L (ref 21–32)
CREAT SERPL-MCNC: 1.03 MG/DL (ref 0.7–1.3)
DIFFERENTIAL METHOD BLD: ABNORMAL
EOSINOPHIL # BLD: 0.2 K/UL (ref 0–0.4)
EOSINOPHIL NFR BLD: 2 % (ref 0–7)
ERYTHROCYTE [DISTWIDTH] IN BLOOD BY AUTOMATED COUNT: 12.9 % (ref 11.5–14.5)
GLUCOSE SERPL-MCNC: 91 MG/DL (ref 65–100)
HCT VFR BLD AUTO: 36.2 % (ref 36.6–50.3)
HGB BLD-MCNC: 11.6 G/DL (ref 12.1–17)
IMM GRANULOCYTES # BLD AUTO: 0 K/UL (ref 0–0.04)
IMM GRANULOCYTES NFR BLD AUTO: 0 % (ref 0–0.5)
LYMPHOCYTES # BLD: 2.6 K/UL (ref 0.8–3.5)
LYMPHOCYTES NFR BLD: 28 % (ref 12–49)
MCH RBC QN AUTO: 28.9 PG (ref 26–34)
MCHC RBC AUTO-ENTMCNC: 32 G/DL (ref 30–36.5)
MCV RBC AUTO: 90.3 FL (ref 80–99)
MONOCYTES # BLD: 0.7 K/UL (ref 0–1)
MONOCYTES NFR BLD: 7 % (ref 5–13)
NEUTS SEG # BLD: 5.9 K/UL (ref 1.8–8)
NEUTS SEG NFR BLD: 62 % (ref 32–75)
NRBC # BLD: 0 K/UL (ref 0–0.01)
NRBC BLD-RTO: 0 PER 100 WBC
PLATELET # BLD AUTO: 237 K/UL (ref 150–400)
PMV BLD AUTO: 9.5 FL (ref 8.9–12.9)
POTASSIUM SERPL-SCNC: 2.8 MMOL/L (ref 3.5–5.1)
RBC # BLD AUTO: 4.01 M/UL (ref 4.1–5.7)
SODIUM SERPL-SCNC: 139 MMOL/L (ref 136–145)
WBC # BLD AUTO: 9.5 K/UL (ref 4.1–11.1)

## 2023-04-27 PROCEDURE — 99232 SBSQ HOSP IP/OBS MODERATE 35: CPT | Performed by: SURGERY

## 2023-04-27 PROCEDURE — 80048 BASIC METABOLIC PNL TOTAL CA: CPT

## 2023-04-27 PROCEDURE — 74011250637 HC RX REV CODE- 250/637: Performed by: SURGERY

## 2023-04-27 PROCEDURE — 74011250636 HC RX REV CODE- 250/636: Performed by: SURGERY

## 2023-04-27 PROCEDURE — 36415 COLL VENOUS BLD VENIPUNCTURE: CPT

## 2023-04-27 PROCEDURE — 85025 COMPLETE CBC W/AUTO DIFF WBC: CPT

## 2023-04-27 RX ORDER — POTASSIUM CHLORIDE 20 MEQ/1
20 TABLET, EXTENDED RELEASE ORAL 2 TIMES DAILY
Qty: 14 TABLET | Refills: 0 | Status: SHIPPED | OUTPATIENT
Start: 2023-04-28 | End: 2023-05-05

## 2023-04-27 RX ORDER — POTASSIUM CHLORIDE 20 MEQ/1
40 TABLET, EXTENDED RELEASE ORAL ONCE
Status: COMPLETED | OUTPATIENT
Start: 2023-04-27 | End: 2023-04-27

## 2023-04-27 RX ADMIN — POTASSIUM CHLORIDE 40 MEQ: 1500 TABLET, EXTENDED RELEASE ORAL at 16:14

## 2023-04-27 RX ADMIN — SODIUM CHLORIDE 125 ML/HR: 9 INJECTION, SOLUTION INTRAVENOUS at 01:50

## 2023-04-27 NOTE — PROGRESS NOTES
Patient discharging home today, self care. Discharge plan of care/case management plan validated with provider discharge order.

## 2023-04-27 NOTE — DISCHARGE INSTRUCTIONS
Take potassium supplements as instructed  Check potassium level on Monday, it is scheduled at lab Vidimax.

## 2023-04-27 NOTE — PROGRESS NOTES
PROGRESS NOTE      Chief Complaints:  Patient examined this morning. HPI and  Objective:    Patient continue having bowel movement. Denies nausea. Pain is resolved. Review of Systems:  Rest of review of system reviewed personally and they are negative. EXAM:  Visit Vitals  /77 (BP 1 Location: Left upper arm, BP Patient Position: At rest)   Pulse (!) 54   Temp 98.2 °F (36.8 °C)   Resp 18   Ht 6' 1\" (1.854 m)   Wt 275 lb (124.7 kg)   SpO2 99%   BMI 36.28 kg/m²       Patient is awake   Head and neck atraumatic, normocephalic. ENT: No hoarse voice, gaze appropriate. Cardiac system regular rate rhythm. Pulmonary no audible wheeze, no cyanosis. Chest wall excursion normal with respiration cycle  Abdomen is soft not particularly distended. Neurologically nonfocal.  Hematology system: No bruising noted. Musculoskeletal system: No joint deformity noted. Genitourinary system: No hematuria noted. Skin is warm and moist.  Psychosocial: Cooperative. Vascular examination as previously noted no changes.     Current Facility-Administered Medications   Medication Dose Route Frequency Provider Last Rate Last Admin    albuterol-ipratropium (DUO-NEB) 2.5 MG-0.5 MG/3 ML  3 mL Nebulization Q6H PRN Kyra Mir MD        benzocaine (HURRICANE) 20 % spray   Mucous Membrane PRN Kyra Mir MD        acetaminophen (TYLENOL) tablet 650 mg  650 mg Oral Q6H PRN Kyra Mir MD        Or    acetaminophen (TYLENOL) suppository 650 mg  650 mg Rectal Q6H PRN Kyra Mir MD        ondansetron (ZOFRAN ODT) tablet 4 mg  4 mg Oral Q8H PRN Kyra Mir MD        Or    [facility]ron [facility]) injection 4 mg  4 mg IntraVENous Q6H PRN Kyra Mir MD        enoxaparin (LOVENOX) injection 40 mg  40 mg SubCUTAneous DAILY Kyra Mir MD   40 mg at 04/25/23 1002    0.9% sodium chloride infusion  125 mL/hr IntraVENous CONTINUOUS Kyra Mir  mL/hr at 04/27/23 0150 125 mL/hr at 04/27/23 0150           No results found for this or any previous visit (from the past 24 hour(s)). ASSESSMENT:   Patient is 65 y.o. with diagnosis of : Active Problems:    SBO (small bowel obstruction) ([address] (4/24/2023)      Bowel obstruction ([address] (4/24/2023)        PLAN:                 Patient appears doing much better. We will advance diet. Labs are pending. If potassium level is low most likely patient can go home with a potassium supplement daily.

## 2023-04-27 NOTE — PROGRESS NOTES
Problem: Falls - Risk of  Goal: *Absence of Falls  Description: Document Caty Lama Fall Risk and appropriate interventions in the flowsheet.   Outcome: Progressing Towards Goal  Note: Fall Risk Interventions:                                Problem: Pain  Goal: *Control of Pain  Outcome: Progressing Towards Goal

## 2023-04-27 NOTE — PROGRESS NOTES
Discharge plan of care/case management plan validated with provider discharge order. Pt given discharge instructions, doctor education and follow up appointments, education on possible emergency, diet, and actively. Pt verbalized understanding, questions asked and answered. IV removed intact, belongings returned to pt, pt wheeled down to family in car.

## 2023-04-29 DIAGNOSIS — E87.6 HYPOKALEMIA: Primary | ICD-10-CM

## 2023-05-01 DIAGNOSIS — E87.6 HYPOKALEMIA: ICD-10-CM

## 2023-05-04 ENCOUNTER — OFFICE VISIT (OUTPATIENT)
Dept: SURGERY | Age: 58
End: 2023-05-04

## 2023-05-04 VITALS
WEIGHT: 277 LBS | RESPIRATION RATE: 16 BRPM | BODY MASS INDEX: 36.71 KG/M2 | OXYGEN SATURATION: 97 % | HEIGHT: 73 IN | TEMPERATURE: 97.7 F | HEART RATE: 57 BPM | DIASTOLIC BLOOD PRESSURE: 73 MMHG | SYSTOLIC BLOOD PRESSURE: 129 MMHG

## 2023-05-04 DIAGNOSIS — E87.6 HYPOKALEMIA: Primary | ICD-10-CM

## 2023-05-04 DIAGNOSIS — K56.609 SBO (SMALL BOWEL OBSTRUCTION) (HCC): ICD-10-CM

## 2023-05-05 PROBLEM — E87.6 HYPOKALEMIA: Status: ACTIVE | Noted: 2023-05-05

## 2023-05-05 LAB
BUN SERPL-MCNC: 16 MG/DL (ref 6–24)
BUN/CREAT SERPL: 15 (ref 9–20)
CALCIUM SERPL-MCNC: 9.5 MG/DL (ref 8.7–10.2)
CHLORIDE SERPL-SCNC: 101 MMOL/L (ref 96–106)
CO2 SERPL-SCNC: 25 MMOL/L (ref 20–29)
CREAT SERPL-MCNC: 1.08 MG/DL (ref 0.76–1.27)
EGFRCR SERPLBLD CKD-EPI 2021: 81 ML/MIN/1.73
GLUCOSE SERPL-MCNC: 90 MG/DL (ref 70–99)
POTASSIUM SERPL-SCNC: 4.5 MMOL/L (ref 3.5–5.2)
SODIUM SERPL-SCNC: 142 MMOL/L (ref 134–144)

## 2023-08-07 RX ORDER — AMLODIPINE BESYLATE 10 MG/1
TABLET ORAL
Qty: 90 TABLET | Refills: 3 | Status: SHIPPED | OUTPATIENT
Start: 2023-08-07

## 2023-10-08 NOTE — LETTER
8/4/2020 11:46 AM 
 
Mr. Estelle Bob [place] [id number] Dear Mr. Michael Alcala missed you! We have sent a 30 day supply of your medication to pharmacy,but please call our office at [phone] and schedule a follow up appointment for your continued care before the next refill. Sincerely, [address] [facility] [address], DO 
 within normal limits

## 2024-09-13 ENCOUNTER — TELEPHONE (OUTPATIENT)
Age: 59
End: 2024-09-13

## 2024-09-13 RX ORDER — AMLODIPINE BESYLATE 10 MG/1
10 TABLET ORAL DAILY
Qty: 90 TABLET | Refills: 3 | Status: SHIPPED | OUTPATIENT
Start: 2024-09-13

## 2024-09-13 NOTE — TELEPHONE ENCOUNTER
Pt has appt for 10/04/24 but needs refill of   amLODIPine (NORVASC) 10 MG table until appt. CB # [phone] Thank You

## 2024-10-04 ENCOUNTER — OFFICE VISIT (OUTPATIENT)
Age: 59
End: 2024-10-04

## 2024-10-04 VITALS
OXYGEN SATURATION: 97 % | RESPIRATION RATE: 18 BRPM | DIASTOLIC BLOOD PRESSURE: 81 MMHG | HEIGHT: 73 IN | TEMPERATURE: 98.1 F | BODY MASS INDEX: 36.98 KG/M2 | WEIGHT: 279 LBS | HEART RATE: 71 BPM | SYSTOLIC BLOOD PRESSURE: 124 MMHG

## 2024-10-04 DIAGNOSIS — I10 ESSENTIAL HYPERTENSION: ICD-10-CM

## 2024-10-04 DIAGNOSIS — E66.09 CLASS 2 OBESITY DUE TO EXCESS CALORIES WITHOUT SERIOUS COMORBIDITY WITH BODY MASS INDEX (BMI) OF 36.0 TO 36.9 IN ADULT: ICD-10-CM

## 2024-10-04 DIAGNOSIS — Z00.01 ENCOUNTER FOR ANNUAL GENERAL MEDICAL EXAMINATION WITH ABNORMAL FINDINGS IN ADULT: Primary | ICD-10-CM

## 2024-10-04 DIAGNOSIS — E66.812 CLASS 2 OBESITY DUE TO EXCESS CALORIES WITHOUT SERIOUS COMORBIDITY WITH BODY MASS INDEX (BMI) OF 36.0 TO 36.9 IN ADULT: ICD-10-CM

## 2024-10-04 DIAGNOSIS — Z00.01 ENCOUNTER FOR ANNUAL GENERAL MEDICAL EXAMINATION WITH ABNORMAL FINDINGS IN ADULT: ICD-10-CM

## 2024-10-04 RX ORDER — CHLORTHALIDONE 25 MG/1
25 TABLET ORAL DAILY
Qty: 90 TABLET | Refills: 3 | Status: SHIPPED | OUTPATIENT
Start: 2024-10-04 | End: 2025-09-29

## 2024-10-04 RX ORDER — CHLORTHALIDONE 25 MG/1
25 TABLET ORAL DAILY
Qty: 90 TABLET | Refills: 3 | Status: SHIPPED | OUTPATIENT
Start: 2024-10-04 | End: 2024-10-04

## 2024-10-04 SDOH — ECONOMIC STABILITY: FOOD INSECURITY: WITHIN THE PAST 12 MONTHS, YOU WORRIED THAT YOUR FOOD WOULD RUN OUT BEFORE YOU GOT MONEY TO BUY MORE.: NEVER TRUE

## 2024-10-04 SDOH — ECONOMIC STABILITY: FOOD INSECURITY: WITHIN THE PAST 12 MONTHS, THE FOOD YOU BOUGHT JUST DIDN'T LAST AND YOU DIDN'T HAVE MONEY TO GET MORE.: NEVER TRUE

## 2024-10-04 SDOH — ECONOMIC STABILITY: INCOME INSECURITY: HOW HARD IS IT FOR YOU TO PAY FOR THE VERY BASICS LIKE FOOD, HOUSING, MEDICAL CARE, AND HEATING?: NOT HARD AT ALL

## 2024-10-04 ASSESSMENT — ENCOUNTER SYMPTOMS
EYES NEGATIVE: 1
SHORTNESS OF BREATH: 0
ABDOMINAL PAIN: 0
CONSTIPATION: 0
SINUS PAIN: 0
SORE THROAT: 0
DIARRHEA: 0
COUGH: 0
ALLERGIC/IMMUNOLOGIC NEGATIVE: 1
RHINORRHEA: 0

## 2024-10-04 ASSESSMENT — PATIENT HEALTH QUESTIONNAIRE - PHQ9
SUM OF ALL RESPONSES TO PHQ9 QUESTIONS 1 & 2: 0
SUM OF ALL RESPONSES TO PHQ QUESTIONS 1-9: 0
1. LITTLE INTEREST OR PLEASURE IN DOING THINGS: NOT AT ALL
SUM OF ALL RESPONSES TO PHQ QUESTIONS 1-9: 0
SUM OF ALL RESPONSES TO PHQ QUESTIONS 1-9: 0
2. FEELING DOWN, DEPRESSED OR HOPELESS: NOT AT ALL
SUM OF ALL RESPONSES TO PHQ QUESTIONS 1-9: 0

## 2024-10-04 NOTE — PROGRESS NOTES
Chief Complaint   Patient presents with    Follow-up     Patient presents in office today for f/u and labs.  No concerns.        \"Have you been to the ER, urgent care clinic since your last visit?  Hospitalized since your last visit?\"    NO    “Have you seen or consulted any other health care providers outside our system since your last visit?”    NO      “Have you had a colorectal cancer screening such as a colonoscopy/FIT/Cologuard?    NO    Date of last Colonoscopy: 6/12/2019  No cologuard on file  No FIT/FOBT on file   No flexible sigmoidoscopy on file

## 2024-10-04 NOTE — PROGRESS NOTES
Chief Complaint   Patient presents with    Follow-up     Patient presents in office today for f/u and labs.  No concerns.        \"Have you been to the ER, urgent care clinic since your last visit?  Hospitalized since your last visit?\"    NO    “Have you seen or consulted any other health care providers outside our system since your last visit?”    NO      “Have you had a colorectal cancer screening such as a colonoscopy/FIT/Cologuard?    NO    Date of last Colonoscopy: 2019  No cologuard on file  No FIT/FOBT on file   No flexible sigmoidoscopy on file                Danilo Francis (:  1965) is a 59 y.o. male, here for evaluation of the following chief complaint(s):  Follow-up      Patient formally followed with Dr. Youssef in office.    Patient stated that he called to get his prescriptions refilled and was told that he needed to make a follow-up appointment.  He states that he has been doing well at home with his blood pressure management and has had not had any concerns.    He stated he is worried a little bit about his weight and wished to discuss weight loss options.    Patient denies any other acute and/or chronic complaints.     Screenings    Ophthalmology/optometrist: Will make appt  Colonoscopy: coming up next few months age 45-75  Cologuard: N/A age 45-75  PSA:  age 55+  Annual physical: 10/04/2024      Assessment & Plan  Essential hypertension   Chronic, at goal (stable), continue current treatment plan         Class 2 obesity due to excess calories without serious comorbidity with body mass index (BMI) of 36.0 to 36.9 in adult    Spoke with the patient about dietary changes.  Spoke to the patient about the difference between phentermine and GLP's.  Patient decided to start by trying a GLP if possible.  Patient was advised of risk and benefits in the management of GLP's in the follow-up as necessary.         Encounter for annual general medical examination with abnormal findings in adult

## 2024-10-04 NOTE — PATIENT INSTRUCTIONS
GLP-1 (glucagon-like peptide) medications are commonly used for diabetes management as well as weight loss. They have been around a long time (first one was FDA-approved in 2005) but have been updated and refined more recently to be safer and more effective. They work in various ways, but usually lead to decreased gastric emptying which means it forces your body to digest meals more slowly and therefore reduces the amount of calories you need to feel full and satisfied. This generally leads to weight loss, which can sometimes be quite profound. It also works in the pancreas to help your body produce insulin more efficiently, which helps lower blood glucose in someone with diabetes. More recent research has now shown that long-term use of these medications also reduces your risk of developing heart disease or kidney failure, as well as reducing high blood pressure, improving cholesterol and others. The most common side effects including nausea, vomiting, loss of appetite and GI disturbances. These are usually worst when you start or increase the dose, but typically get better with time. Although very rare, pancreatitis has been associated with these meds and that would require hospitalization. Most of these are available through injection form with a tiny needle that is typically painless. Areas of your body you can give the injections include your belly, outer thighs, upper buttocks and the backs of your arms. There are more and more options becoming available each year, but here is a list of what's out right now:    FDA-approved for obesity / weight loss:  Wegovy (same as Ozempic - Semaglutide) - injection once a week.  Zepbound (same as Mounjaro - Tirzepatide) - injection once a week.    FDA-approved for Diabetes treatment:  Trulicity (Dulaglutide) - injection once a week.  Byetta (Exenatide) - injection twice a day.  Bydureon (Exenatide extended-release) - injection once a week.  Victoza (Liraglutide) - injection

## 2024-10-05 LAB
ALBUMIN SERPL-MCNC: 4.1 G/DL (ref 3.5–5)
ALBUMIN/GLOB SERPL: 1 (ref 1.1–2.2)
ALP SERPL-CCNC: 55 U/L (ref 45–117)
ALT SERPL-CCNC: 27 U/L (ref 12–78)
ANION GAP SERPL CALC-SCNC: 6 MMOL/L (ref 2–12)
AST SERPL-CCNC: 19 U/L (ref 15–37)
BILIRUB SERPL-MCNC: 1.3 MG/DL (ref 0.2–1)
BUN SERPL-MCNC: 22 MG/DL (ref 6–20)
BUN/CREAT SERPL: 15 (ref 12–20)
CALCIUM SERPL-MCNC: 9 MG/DL (ref 8.5–10.1)
CHLORIDE SERPL-SCNC: 103 MMOL/L (ref 97–108)
CHOLEST SERPL-MCNC: 170 MG/DL
CO2 SERPL-SCNC: 29 MMOL/L (ref 21–32)
CREAT SERPL-MCNC: 1.45 MG/DL (ref 0.7–1.3)
ERYTHROCYTE [DISTWIDTH] IN BLOOD BY AUTOMATED COUNT: 12.5 % (ref 11.5–14.5)
EST. AVERAGE GLUCOSE BLD GHB EST-MCNC: 111 MG/DL
GLOBULIN SER CALC-MCNC: 4.1 G/DL (ref 2–4)
GLUCOSE SERPL-MCNC: 91 MG/DL (ref 65–100)
HBA1C MFR BLD: 5.5 % (ref 4–5.6)
HCT VFR BLD AUTO: 39.9 % (ref 36.6–50.3)
HDLC SERPL-MCNC: 42 MG/DL
HDLC SERPL: 4 (ref 0–5)
HGB BLD-MCNC: 13.2 G/DL (ref 12.1–17)
LDLC SERPL CALC-MCNC: 112 MG/DL (ref 0–100)
MCH RBC QN AUTO: 29.3 PG (ref 26–34)
MCHC RBC AUTO-ENTMCNC: 33.1 G/DL (ref 30–36.5)
MCV RBC AUTO: 88.7 FL (ref 80–99)
NRBC # BLD: 0 K/UL (ref 0–0.01)
NRBC BLD-RTO: 0 PER 100 WBC
PLATELET # BLD AUTO: 282 K/UL (ref 150–400)
PMV BLD AUTO: 10.1 FL (ref 8.9–12.9)
POTASSIUM SERPL-SCNC: 3.1 MMOL/L (ref 3.5–5.1)
PROT SERPL-MCNC: 8.2 G/DL (ref 6.4–8.2)
PSA SERPL-MCNC: 8.3 NG/ML (ref 0.01–4)
RBC # BLD AUTO: 4.5 M/UL (ref 4.1–5.7)
SODIUM SERPL-SCNC: 138 MMOL/L (ref 136–145)
TRIGL SERPL-MCNC: 80 MG/DL
TSH SERPL DL<=0.05 MIU/L-ACNC: 0.36 UIU/ML (ref 0.36–3.74)
VLDLC SERPL CALC-MCNC: 16 MG/DL
WBC # BLD AUTO: 6.1 K/UL (ref 4.1–11.1)

## 2024-10-07 ENCOUNTER — CLINICAL DOCUMENTATION (OUTPATIENT)
Facility: HOSPITAL | Age: 59
End: 2024-10-07

## 2024-10-07 ENCOUNTER — TELEPHONE (OUTPATIENT)
Age: 59
End: 2024-10-07

## 2024-10-07 DIAGNOSIS — E87.6 HYPOKALEMIA: Primary | ICD-10-CM

## 2024-10-07 DIAGNOSIS — R97.20 ELEVATED PSA: Primary | ICD-10-CM

## 2024-10-07 LAB — MAGNESIUM SERPL-MCNC: 2.2 MG/DL (ref 1.6–2.4)

## 2024-10-07 RX ORDER — POTASSIUM CHLORIDE 750 MG/1
10 TABLET, EXTENDED RELEASE ORAL DAILY
Qty: 10 TABLET | Refills: 0 | Status: SHIPPED | OUTPATIENT
Start: 2024-10-07 | End: 2024-10-17

## 2024-10-07 NOTE — PROGRESS NOTES
[facility] at [facility]  Specialty Pharmacy Update    Date: 10/07/24    Danilo Francis 1965    Medication: Zepbound 2.5 mg/0.5 ml     This is PLAN EXCLUSION - drug class weight loss is excluded by plan benefit.  Best option would be to use copay card and cost would be $650/month.      Spoke with Mr. Francis and he did not want to do this.  He will be in touch to discuss other options.     Corinne McEwen, [facility] at [address], [address]   [place] [address]  phone: [phone]   fax: [phone]

## 2024-10-07 NOTE — TELEPHONE ENCOUNTER
Called and spoke with the patient about his potassium and kidney markers.  Recommended the patient to increase water intake.  Spoke with the patient about increasing potassium rich foods.  Spoke to him about trying potassium oral daily for 1 week and then him trying dietary changes and rechecking his blood work in 2 weeks.

## 2025-04-04 ENCOUNTER — OFFICE VISIT (OUTPATIENT)
Facility: CLINIC | Age: 60
End: 2025-04-04
Payer: COMMERCIAL

## 2025-04-04 VITALS
HEIGHT: 73 IN | OXYGEN SATURATION: 96 % | HEART RATE: 75 BPM | DIASTOLIC BLOOD PRESSURE: 78 MMHG | TEMPERATURE: 97.9 F | BODY MASS INDEX: 37.24 KG/M2 | SYSTOLIC BLOOD PRESSURE: 121 MMHG | RESPIRATION RATE: 18 BRPM | WEIGHT: 281 LBS

## 2025-04-04 DIAGNOSIS — Z00.01 ENCOUNTER FOR GENERAL ADULT MEDICAL EXAMINATION WITH ABNORMAL FINDINGS: ICD-10-CM

## 2025-04-04 DIAGNOSIS — R97.20 ELEVATED PSA: ICD-10-CM

## 2025-04-04 DIAGNOSIS — I10 ESSENTIAL HYPERTENSION: ICD-10-CM

## 2025-04-04 DIAGNOSIS — Z00.00 PHYSICAL EXAM: Primary | ICD-10-CM

## 2025-04-04 DIAGNOSIS — Z00.00 PHYSICAL EXAM: ICD-10-CM

## 2025-04-04 PROCEDURE — 3074F SYST BP LT 130 MM HG: CPT

## 2025-04-04 PROCEDURE — 3078F DIAST BP <80 MM HG: CPT

## 2025-04-04 PROCEDURE — 99396 PREV VISIT EST AGE 40-64: CPT

## 2025-04-04 RX ORDER — CHLORTHALIDONE 25 MG/1
25 TABLET ORAL DAILY
Qty: 90 TABLET | Refills: 3 | Status: SHIPPED | OUTPATIENT
Start: 2025-04-04 | End: 2026-03-30

## 2025-04-04 RX ORDER — AMLODIPINE BESYLATE 10 MG/1
10 TABLET ORAL DAILY
Qty: 90 TABLET | Refills: 3 | Status: SHIPPED | OUTPATIENT
Start: 2025-04-04

## 2025-04-04 SDOH — ECONOMIC STABILITY: FOOD INSECURITY: WITHIN THE PAST 12 MONTHS, THE FOOD YOU BOUGHT JUST DIDN'T LAST AND YOU DIDN'T HAVE MONEY TO GET MORE.: NEVER TRUE

## 2025-04-04 SDOH — ECONOMIC STABILITY: FOOD INSECURITY: WITHIN THE PAST 12 MONTHS, YOU WORRIED THAT YOUR FOOD WOULD RUN OUT BEFORE YOU GOT MONEY TO BUY MORE.: NEVER TRUE

## 2025-04-04 ASSESSMENT — PATIENT HEALTH QUESTIONNAIRE - PHQ9
SUM OF ALL RESPONSES TO PHQ QUESTIONS 1-9: 0
SUM OF ALL RESPONSES TO PHQ QUESTIONS 1-9: 0
1. LITTLE INTEREST OR PLEASURE IN DOING THINGS: NOT AT ALL
SUM OF ALL RESPONSES TO PHQ QUESTIONS 1-9: 0
2. FEELING DOWN, DEPRESSED OR HOPELESS: NOT AT ALL
SUM OF ALL RESPONSES TO PHQ QUESTIONS 1-9: 0

## 2025-04-04 ASSESSMENT — ENCOUNTER SYMPTOMS
CONSTIPATION: 0
ABDOMINAL PAIN: 0
SHORTNESS OF BREATH: 0
EYES NEGATIVE: 1
ALLERGIC/IMMUNOLOGIC NEGATIVE: 1
DIARRHEA: 0
RHINORRHEA: 0
COUGH: 0
SORE THROAT: 0
SINUS PAIN: 0

## 2025-04-04 NOTE — PROGRESS NOTES
Patient here for physical, not fasting.     \"Have you been to the ER, urgent care clinic since your last visit?  Hospitalized since your last visit?\"    NO    “Have you seen or consulted any other health care providers outside our system since your last visit?”    NO

## 2025-04-04 NOTE — PROGRESS NOTES
Patient here for physical, not fasting.     \"Have you been to the ER, urgent care clinic since your last visit?  Hospitalized since your last visit?\"    NO    “Have you seen or consulted any other health care providers outside our system since your last visit?”    NO               Danilo Francis (:  1965) is a 59 y.o. male, here for evaluation of the following chief complaint(s):  Annual Exam        Subjective   History of Present Illness  The patient presents for a physical exam.    An ophthalmological examination was completed, yielding normal results. A colonoscopy was performed, and records indicate a follow-up is recommended in five years. Uncertainty about the presence of polyps was expressed. Elevated PSA levels were noted in 2024, but a consultation with a urologist has not yet occurred. Blood counts, electrolytes, cholesterol, thyroid function, and A1c levels from 2024 were reported as normal, with a minor elevation in cholesterol and a slight drop in kidney function, potentially due to dehydration.     No chest pain, shortness of breath, lumps, bumps, tenderness, constipation, or diarrhea are reported.     Screenings    Ophthalmology/optometrist:   Colonoscopy:  age 45-75  Cologuard: N/A age 45-75  PSA:  age 55+  Annual physical: 10/04/2024    Review of Systems   Constitutional:  Negative for activity change, appetite change and fatigue.   HENT:  Negative for postnasal drip, rhinorrhea, sinus pain and sore throat.    Eyes: Negative.    Respiratory:  Negative for cough and shortness of breath.    Cardiovascular:  Negative for chest pain.   Gastrointestinal:  Negative for abdominal pain, constipation and diarrhea.   Endocrine: Negative.    Musculoskeletal:  Negative for arthralgias, joint swelling and myalgias.   Skin: Negative.    Allergic/Immunologic: Negative.    Neurological:  Negative for dizziness, syncope, light-headedness and headaches.   Hematological: Negative.

## 2025-04-05 LAB
ALBUMIN SERPL-MCNC: 4.1 G/DL (ref 3.5–5)
ALBUMIN/GLOB SERPL: 1 (ref 1.1–2.2)
ALP SERPL-CCNC: 55 U/L (ref 45–117)
ALT SERPL-CCNC: 28 U/L (ref 12–78)
ANION GAP SERPL CALC-SCNC: 8 MMOL/L (ref 2–12)
AST SERPL-CCNC: 19 U/L (ref 15–37)
BILIRUB SERPL-MCNC: 0.7 MG/DL (ref 0.2–1)
BUN SERPL-MCNC: 19 MG/DL (ref 6–20)
BUN/CREAT SERPL: 14 (ref 12–20)
CALCIUM SERPL-MCNC: 9.1 MG/DL (ref 8.5–10.1)
CHLORIDE SERPL-SCNC: 102 MMOL/L (ref 97–108)
CO2 SERPL-SCNC: 29 MMOL/L (ref 21–32)
CREAT SERPL-MCNC: 1.39 MG/DL (ref 0.7–1.3)
EST. AVERAGE GLUCOSE BLD GHB EST-MCNC: 103 MG/DL
GLOBULIN SER CALC-MCNC: 4 G/DL (ref 2–4)
GLUCOSE SERPL-MCNC: 120 MG/DL (ref 65–100)
HBA1C MFR BLD: 5.2 % (ref 4–5.6)
POTASSIUM SERPL-SCNC: 3.2 MMOL/L (ref 3.5–5.1)
PROT SERPL-MCNC: 8.1 G/DL (ref 6.4–8.2)
SODIUM SERPL-SCNC: 139 MMOL/L (ref 136–145)

## 2025-04-06 ENCOUNTER — RESULTS FOLLOW-UP (OUTPATIENT)
Facility: CLINIC | Age: 60
End: 2025-04-06

## 2025-04-06 NOTE — RESULT ENCOUNTER NOTE
Notify patient via Slingbox message        Your metabolic panel which looks at your liver function looks good.  Your kidney function is stable.  Chronic kidney disease stage 3 AA.  I want to verify that you been taking your potassium if not I will send in a new prescription?  Please just send me a message on Slingbox.    Armin Francis,    Attached are the results of your hemoglobin A1C test. As you know, this is a 3-month measurement of your blood glucose levels. This test is a much more accurate picture of your long-term sugar control, as compared to a spot glucose check. Your number was 5.2, normal, which indicates excellent control. Our goal is to always be below 7, or as close to 7 as we can get. Please continue with our current treatment plan and keep up the good work! We will check on this again during our next routine follow-up.    Sincerely,  Bassem

## 2025-04-09 RX ORDER — POTASSIUM CHLORIDE 750 MG/1
10 TABLET, EXTENDED RELEASE ORAL DAILY
Qty: 90 TABLET | Refills: 1 | Status: SHIPPED | OUTPATIENT
Start: 2025-04-09

## 2025-04-09 NOTE — PROGRESS NOTES
Called the patient and spoke to the patient about his potassium being low.  Recommend starting potassium supplementation due to regular intervals of low potassium.  He agreed and sent in the prescription.

## 2025-05-07 ENCOUNTER — TELEPHONE (OUTPATIENT)
Facility: CLINIC | Age: 60
End: 2025-05-07

## 2025-05-07 NOTE — TELEPHONE ENCOUNTER
Attempted to call patient regarding apt on 5/9/25 needing to be r/s due to provider out however no answer and MB is full. Will send Eigenta message

## 2025-07-17 ENCOUNTER — OFFICE VISIT (OUTPATIENT)
Facility: CLINIC | Age: 60
End: 2025-07-17
Payer: COMMERCIAL

## 2025-07-17 VITALS
OXYGEN SATURATION: 97 % | TEMPERATURE: 98.5 F | RESPIRATION RATE: 16 BRPM | BODY MASS INDEX: 36.58 KG/M2 | SYSTOLIC BLOOD PRESSURE: 120 MMHG | WEIGHT: 276 LBS | HEART RATE: 66 BPM | DIASTOLIC BLOOD PRESSURE: 74 MMHG | HEIGHT: 73 IN

## 2025-07-17 DIAGNOSIS — E87.6 HYPOKALEMIA: ICD-10-CM

## 2025-07-17 DIAGNOSIS — R73.9 ELEVATED BLOOD SUGAR: Primary | ICD-10-CM

## 2025-07-17 PROCEDURE — G8417 CALC BMI ABV UP PARAM F/U: HCPCS

## 2025-07-17 PROCEDURE — 99213 OFFICE O/P EST LOW 20 MIN: CPT

## 2025-07-17 PROCEDURE — 3078F DIAST BP <80 MM HG: CPT

## 2025-07-17 PROCEDURE — 3017F COLORECTAL CA SCREEN DOC REV: CPT

## 2025-07-17 PROCEDURE — G8427 DOCREV CUR MEDS BY ELIG CLIN: HCPCS

## 2025-07-17 PROCEDURE — 1036F TOBACCO NON-USER: CPT

## 2025-07-17 PROCEDURE — 3074F SYST BP LT 130 MM HG: CPT

## 2025-07-17 RX ORDER — LATANOPROST 50 UG/ML
1 SOLUTION/ DROPS OPHTHALMIC NIGHTLY
COMMUNITY
Start: 2025-05-03

## 2025-07-17 SDOH — ECONOMIC STABILITY: FOOD INSECURITY: WITHIN THE PAST 12 MONTHS, THE FOOD YOU BOUGHT JUST DIDN'T LAST AND YOU DIDN'T HAVE MONEY TO GET MORE.: NEVER TRUE

## 2025-07-17 SDOH — ECONOMIC STABILITY: FOOD INSECURITY: WITHIN THE PAST 12 MONTHS, YOU WORRIED THAT YOUR FOOD WOULD RUN OUT BEFORE YOU GOT MONEY TO BUY MORE.: NEVER TRUE

## 2025-07-17 ASSESSMENT — PATIENT HEALTH QUESTIONNAIRE - PHQ9
SUM OF ALL RESPONSES TO PHQ QUESTIONS 1-9: 0
1. LITTLE INTEREST OR PLEASURE IN DOING THINGS: NOT AT ALL
SUM OF ALL RESPONSES TO PHQ QUESTIONS 1-9: 0
2. FEELING DOWN, DEPRESSED OR HOPELESS: NOT AT ALL

## 2025-07-17 ASSESSMENT — ENCOUNTER SYMPTOMS
COUGH: 0
ALLERGIC/IMMUNOLOGIC NEGATIVE: 1
DIARRHEA: 0
CONSTIPATION: 0
SHORTNESS OF BREATH: 0
SINUS PAIN: 0
ABDOMINAL PAIN: 0
SORE THROAT: 0
RHINORRHEA: 0
EYES NEGATIVE: 1

## 2025-07-17 NOTE — PROGRESS NOTES
Chief Complaint   Patient presents with    Abnormal Lab     25: Potassium    Knee Pain     Right knee: Ortho Appointment scheduled     Patient states consent for provider to use [employer] System for documentation of this visit.    Have you been to the ER, urgent care clinic since your last visit?  Hospitalized since your last visit?   NO    Have you seen or consulted any other health care providers outside our system since your last visit?   Ortho        Danilo Francis (:  1965) is a 59 y.o. male, here for evaluation of the following chief complaint(s):  Abnormal Lab (25: Potassium) and Knee Pain (Right knee: Ortho Appointment scheduled)        Subjective   History of Present Illness  The patient presents for evaluation of right knee pain, low potassium, and hypertension.    He reports experiencing pain in his right knee upon waking up one morning about a month ago. The pain persists when he attempts to stand or move. He describes the pain as being located on the inside of the knee, which intensifies when he tries to fully bend or straighten it. He has not sought medical attention for this issue yet but has an appointment scheduled with an orthopedic specialist on Thursday. He has not experienced any similar issues with his left knee. An x-ray was previously conducted, and he was advised to see a specialist.    He has been incorporating more potassium-rich foods into his diet, primarily bananas. He is also taking potassium supplements. He reports that his blood pressure readings have been within normal range. He has lost some weight, dropping from 281 pounds to 276 pounds. He continues to take amlodipine. He reports no chest pain, shortness of breath, lumps, bumps, tenderness, constipation, or diarrhea.    Social History:  Diet: He has been incorporating more potassium-rich foods into his diet, primarily bananas.    Screenings    Ophthalmology/optometrist:   Colonoscopy:  age 45-75  Cologuard: N/A

## 2025-07-17 NOTE — PROGRESS NOTES
Chief Complaint   Patient presents with    Abnormal Lab     4/4/25: Potassium    Knee Pain     Right knee: Ortho Appointment scheduled     Patient states consent for provider to use [employer]RO[employer] System for documentation of this visit.    Have you been to the ER, urgent care clinic since your last visit?  Hospitalized since your last visit?   NO    Have you seen or consulted any other health care providers outside our system since your last visit?   Ortho

## 2025-07-18 LAB
ALBUMIN SERPL-MCNC: 4.1 G/DL (ref 3.5–5)
ALBUMIN/GLOB SERPL: 1.1 (ref 1.1–2.2)
ALP SERPL-CCNC: 57 U/L (ref 45–117)
ALT SERPL-CCNC: 28 U/L (ref 12–78)
ANION GAP SERPL CALC-SCNC: 4 MMOL/L (ref 2–12)
AST SERPL-CCNC: 16 U/L (ref 15–37)
BILIRUB SERPL-MCNC: 1.1 MG/DL (ref 0.2–1)
BUN SERPL-MCNC: 13 MG/DL (ref 6–20)
BUN/CREAT SERPL: 11 (ref 12–20)
CALCIUM SERPL-MCNC: 9.2 MG/DL (ref 8.5–10.1)
CHLORIDE SERPL-SCNC: 106 MMOL/L (ref 97–108)
CO2 SERPL-SCNC: 28 MMOL/L (ref 21–32)
CREAT SERPL-MCNC: 1.15 MG/DL (ref 0.7–1.3)
EST. AVERAGE GLUCOSE BLD GHB EST-MCNC: 94 MG/DL
GLOBULIN SER CALC-MCNC: 3.6 G/DL (ref 2–4)
GLUCOSE SERPL-MCNC: 88 MG/DL (ref 65–100)
HBA1C MFR BLD: 4.9 % (ref 4–5.6)
POTASSIUM SERPL-SCNC: 3.4 MMOL/L (ref 3.5–5.1)
PROT SERPL-MCNC: 7.7 G/DL (ref 6.4–8.2)
SODIUM SERPL-SCNC: 138 MMOL/L (ref 136–145)

## (undated) DEVICE — Device

## (undated) DEVICE — SIMPLICITY FLUFF UNDERPAD 23X36, MODERATE: Brand: SIMPLICITY

## (undated) DEVICE — SET GRAV CK VLV NEEDLESS ST 3 GANGED 4WAY STPCOCK HI FLO 10

## (undated) DEVICE — 1200 GUARD II KIT W/5MM TUBE W/O VAC TUBE: Brand: GUARDIAN

## (undated) DEVICE — ADULT SPO2 SENSOR: Brand: NELLCOR

## (undated) DEVICE — CONTAINER SPEC 20 ML LID NEUT BUFF FORMALIN 10 % POLYPR STS

## (undated) DEVICE — KENDALL RADIOLUCENT FOAM MONITORING ELECTRODE -RECTANGULAR SHAPE: Brand: KENDALL

## (undated) DEVICE — BAG BELONG PT PERS CLEAR HANDL

## (undated) DEVICE — 3M™ CUROS™ DISINFECTING CAP FOR NEEDLELESS CONNECTORS 270/CARTON 20 CARTONS/CASE CFF1-270: Brand: CUROS™

## (undated) DEVICE — CATH IV AUTOGRD BC BLU 22GA 25 -- INSYTE

## (undated) DEVICE — FORCEPS BX L240CM JAW DIA2.8MM L CAP W/ NDL MIC MESH TOOTH

## (undated) DEVICE — SOLIDIFIER MEDC 1200ML -- CONVERT TO 356117

## (undated) DEVICE — CANN NASAL O2 CAPNOGRAPHY AD -- FILTERLINE

## (undated) DEVICE — BAG SPEC BIOHZRD 10 X 10 IN --

## (undated) DEVICE — KIT COLON W/ 1.1OZ LUB AND 2 END